# Patient Record
Sex: MALE | Race: WHITE | NOT HISPANIC OR LATINO | Employment: OTHER | URBAN - METROPOLITAN AREA
[De-identification: names, ages, dates, MRNs, and addresses within clinical notes are randomized per-mention and may not be internally consistent; named-entity substitution may affect disease eponyms.]

---

## 2019-08-12 ENCOUNTER — OFFICE VISIT (OUTPATIENT)
Dept: FAMILY MEDICINE CLINIC | Facility: CLINIC | Age: 54
End: 2019-08-12

## 2019-08-12 VITALS
WEIGHT: 206 LBS | DIASTOLIC BLOOD PRESSURE: 92 MMHG | TEMPERATURE: 98.5 F | HEIGHT: 69 IN | SYSTOLIC BLOOD PRESSURE: 148 MMHG | OXYGEN SATURATION: 96 % | RESPIRATION RATE: 20 BRPM | HEART RATE: 82 BPM | BODY MASS INDEX: 30.51 KG/M2

## 2019-08-12 DIAGNOSIS — K21.9 GASTROESOPHAGEAL REFLUX DISEASE, ESOPHAGITIS PRESENCE NOT SPECIFIED: ICD-10-CM

## 2019-08-12 DIAGNOSIS — E29.1 TESTICULAR HYPOGONADISM: ICD-10-CM

## 2019-08-12 DIAGNOSIS — F41.8 DEPRESSION WITH ANXIETY: ICD-10-CM

## 2019-08-12 DIAGNOSIS — G89.4 CHRONIC PAIN SYNDROME: ICD-10-CM

## 2019-08-12 DIAGNOSIS — F51.01 PRIMARY INSOMNIA: ICD-10-CM

## 2019-08-12 DIAGNOSIS — E11.51 TYPE 2 DIABETES MELLITUS WITH DIABETIC PERIPHERAL ANGIOPATHY WITHOUT GANGRENE, WITHOUT LONG-TERM CURRENT USE OF INSULIN (HCC): Primary | ICD-10-CM

## 2019-08-12 DIAGNOSIS — K44.9 HIATAL HERNIA: ICD-10-CM

## 2019-08-12 DIAGNOSIS — R11.2 NON-INTRACTABLE VOMITING WITH NAUSEA, UNSPECIFIED VOMITING TYPE: ICD-10-CM

## 2019-08-12 DIAGNOSIS — E78.2 MIXED HYPERLIPIDEMIA: ICD-10-CM

## 2019-08-12 DIAGNOSIS — I10 ESSENTIAL HYPERTENSION: ICD-10-CM

## 2019-08-12 DIAGNOSIS — R09.81 SINUS CONGESTION: ICD-10-CM

## 2019-08-12 DIAGNOSIS — E55.9 VITAMIN D DEFICIENCY: ICD-10-CM

## 2019-08-12 DIAGNOSIS — J30.9 ALLERGIC RHINITIS, UNSPECIFIED SEASONALITY, UNSPECIFIED TRIGGER: ICD-10-CM

## 2019-08-12 DIAGNOSIS — K30 DELAYED GASTRIC EMPTYING: ICD-10-CM

## 2019-08-12 PROBLEM — G47.33 OSA (OBSTRUCTIVE SLEEP APNEA): Status: ACTIVE | Noted: 2019-08-12

## 2019-08-12 PROBLEM — E78.5 HYPERLIPIDEMIA: Status: ACTIVE | Noted: 2019-08-12

## 2019-08-12 PROBLEM — H93.19 TINNITUS: Status: ACTIVE | Noted: 2019-08-12

## 2019-08-12 PROBLEM — R94.31 PROLONGED QT INTERVAL: Status: ACTIVE | Noted: 2019-08-12

## 2019-08-12 PROBLEM — G47.00 INSOMNIA: Status: ACTIVE | Noted: 2019-08-12

## 2019-08-12 PROBLEM — Q21.12 PFO (PATENT FORAMEN OVALE): Status: ACTIVE | Noted: 2019-08-12

## 2019-08-12 PROBLEM — E11.59 TYPE 2 DIABETES MELLITUS WITH CIRCULATORY DISORDER, WITHOUT LONG-TERM CURRENT USE OF INSULIN: Status: ACTIVE | Noted: 2019-08-12

## 2019-08-12 PROBLEM — R41.3 MEMORY IMPAIRMENT: Status: ACTIVE | Noted: 2019-08-12

## 2019-08-12 PROBLEM — Z86.73 HISTORY OF CVA (CEREBROVASCULAR ACCIDENT): Status: ACTIVE | Noted: 2019-08-12

## 2019-08-12 PROBLEM — J45.909 ASTHMA: Status: ACTIVE | Noted: 2019-08-12

## 2019-08-12 PROBLEM — D64.9 ANEMIA: Status: RESOLVED | Noted: 2019-08-12 | Resolved: 2019-08-12

## 2019-08-12 PROBLEM — R42 VERTIGO: Status: ACTIVE | Noted: 2019-08-12

## 2019-08-12 PROBLEM — H91.93 HEARING LOSS, BILATERAL: Status: ACTIVE | Noted: 2019-08-12

## 2019-08-12 PROBLEM — M26.609 TMJ DYSFUNCTION: Status: ACTIVE | Noted: 2019-08-12

## 2019-08-12 PROBLEM — H04.129 TEAR FILM INSUFFICIENCY: Status: ACTIVE | Noted: 2019-08-12

## 2019-08-12 PROBLEM — M35.00 SICCA SYNDROME: Status: ACTIVE | Noted: 2019-08-12

## 2019-08-12 PROBLEM — M53.9 MULTILEVEL DEGENERATIVE DISC DISEASE: Status: ACTIVE | Noted: 2019-08-12

## 2019-08-12 PROBLEM — D64.9 ANEMIA: Status: ACTIVE | Noted: 2019-08-12

## 2019-08-12 PROCEDURE — 99214 OFFICE O/P EST MOD 30 MIN: CPT | Performed by: FAMILY MEDICINE

## 2019-08-12 RX ORDER — ROSUVASTATIN CALCIUM 10 MG/1
1 TABLET, COATED ORAL NIGHTLY
Refills: 3 | COMMUNITY
Start: 2019-07-08 | End: 2020-04-10 | Stop reason: SDUPTHER

## 2019-08-12 RX ORDER — LISINOPRIL 20 MG/1
20 TABLET ORAL DAILY
Qty: 90 TABLET | Refills: 3 | Status: SHIPPED | OUTPATIENT
Start: 2019-08-12 | End: 2020-02-17

## 2019-08-12 RX ORDER — MONTELUKAST SODIUM 10 MG/1
1 TABLET ORAL NIGHTLY
COMMUNITY
End: 2020-02-17 | Stop reason: SDUPTHER

## 2019-08-12 RX ORDER — AMLODIPINE BESYLATE 10 MG/1
1 TABLET ORAL DAILY
Refills: 3 | COMMUNITY
Start: 2019-08-08 | End: 2019-08-12

## 2019-08-12 RX ORDER — FLUTICASONE PROPIONATE 50 MCG
2 SPRAY, SUSPENSION (ML) NASAL DAILY
Refills: 12 | COMMUNITY
Start: 2019-07-31 | End: 2020-10-20 | Stop reason: SDUPTHER

## 2019-08-12 RX ORDER — LANCETS
1 EACH MISCELLANEOUS EVERY MORNING
COMMUNITY
End: 2023-01-25 | Stop reason: SDUPTHER

## 2019-08-12 RX ORDER — AMLODIPINE BESYLATE 10 MG/1
1 TABLET ORAL DAILY
COMMUNITY
End: 2021-07-21

## 2019-08-12 RX ORDER — GLIPIZIDE 5 MG/1
1 TABLET, FILM COATED, EXTENDED RELEASE ORAL DAILY
Refills: 3 | COMMUNITY
Start: 2019-08-09 | End: 2019-08-12

## 2019-08-12 RX ORDER — TRAZODONE HYDROCHLORIDE 50 MG/1
1 TABLET ORAL NIGHTLY PRN
COMMUNITY
End: 2021-07-21

## 2019-08-12 RX ORDER — ONDANSETRON 4 MG/1
4 TABLET, FILM COATED ORAL EVERY 8 HOURS PRN
Qty: 30 TABLET | Refills: 2 | Status: SHIPPED | OUTPATIENT
Start: 2019-08-12 | End: 2020-08-17 | Stop reason: SDUPTHER

## 2019-08-12 RX ORDER — VORTIOXETINE 20 MG/1
1 TABLET, FILM COATED ORAL DAILY
Refills: 3 | COMMUNITY
Start: 2019-06-23 | End: 2021-07-21

## 2019-08-12 RX ORDER — LANSOPRAZOLE 30 MG/1
30 CAPSULE, DELAYED RELEASE ORAL 2 TIMES DAILY
Qty: 180 CAPSULE | Refills: 3 | Status: SHIPPED | OUTPATIENT
Start: 2019-08-12 | End: 2019-08-15 | Stop reason: CLARIF

## 2019-08-12 RX ORDER — OXYCODONE AND ACETAMINOPHEN 7.5; 325 MG/1; MG/1
1 TABLET ORAL EVERY 6 HOURS PRN
Refills: 0 | COMMUNITY
Start: 2019-08-09 | End: 2020-02-17

## 2019-08-12 RX ORDER — MELOXICAM 15 MG/1
1 TABLET ORAL DAILY
Refills: 0 | COMMUNITY
Start: 2019-08-02 | End: 2021-07-21

## 2019-08-12 RX ORDER — METHADONE HYDROCHLORIDE 10 MG/1
0.5 TABLET ORAL 3 TIMES DAILY
Refills: 0 | COMMUNITY
Start: 2019-08-09 | End: 2020-02-17

## 2019-08-12 RX ORDER — PILOCARPINE HYDROCHLORIDE 7.5 MG/1
1 TABLET, FILM COATED ORAL 3 TIMES DAILY
COMMUNITY
End: 2020-02-17 | Stop reason: SINTOL

## 2019-08-12 RX ORDER — LANSOPRAZOLE 30 MG/1
1 CAPSULE, DELAYED RELEASE ORAL 2 TIMES DAILY
COMMUNITY
End: 2019-08-12 | Stop reason: SDUPTHER

## 2019-08-12 RX ORDER — CHOLECALCIFEROL (VITAMIN D3) 1250 MCG
1 CAPSULE ORAL
COMMUNITY
End: 2020-08-14 | Stop reason: SDUPTHER

## 2019-08-12 RX ORDER — ALBUTEROL SULFATE 90 UG/1
2 AEROSOL, METERED RESPIRATORY (INHALATION) EVERY 4 HOURS PRN
COMMUNITY

## 2019-08-12 RX ORDER — METOCLOPRAMIDE 5 MG/1
1 TABLET ORAL
COMMUNITY
End: 2022-08-26

## 2019-08-12 NOTE — PATIENT INSTRUCTIONS
Gastroesophageal Reflux Disease, Adult    Normally, food travels down the esophagus and stays in the stomach to be digested. However, when a person has gastroesophageal reflux disease (GERD), food and stomach acid move back up into the esophagus. When this happens, the esophagus becomes sore and inflamed. Over time, GERD can create small holes (ulcers) in the lining of the esophagus.  What are the causes?  This condition is caused by a problem with the muscle between the esophagus and the stomach (lower esophageal sphincter, or LES). Normally, the LES muscle closes after food passes through the esophagus to the stomach. When the LES is weakened or abnormal, it does not close properly, and that allows food and stomach acid to go back up into the esophagus. The LES can be weakened by certain dietary substances, medicines, and medical conditions, including:  · Tobacco use.  · Pregnancy.  · Having a hiatal hernia.  · Heavy alcohol use.  · Certain foods and beverages, such as coffee, chocolate, onions, and peppermint.  What increases the risk?  This condition is more likely to develop in:  · People who have an increased body weight.  · People who have connective tissue disorders.  · People who use NSAID medicines.  What are the signs or symptoms?  Symptoms of this condition include:  · Heartburn.  · Difficult or painful swallowing.  · The feeling of having a lump in the throat.  · A bitter taste in the mouth.  · Bad breath.  · Having a large amount of saliva.  · Having an upset or bloated stomach.  · Belching.  · Chest pain.  · Shortness of breath or wheezing.  · Ongoing (chronic) cough or a night-time cough.  · Wearing away of tooth enamel.  · Weight loss.  Different conditions can cause chest pain. Make sure to see your health care provider if you experience chest pain.  How is this diagnosed?  Your health care provider will take a medical history and perform a physical exam. To determine if you have mild or severe  GERD, your health care provider may also monitor how you respond to treatment. You may also have other tests, including:  · An endoscopy to examine your stomach and esophagus with a small camera.  · A test that measures the acidity level in your esophagus.  · A test that measures how much pressure is on your esophagus.  · A barium swallow or modified barium swallow to show the shape, size, and functioning of your esophagus.  How is this treated?  The goal of treatment is to help relieve your symptoms and to prevent complications. Treatment for this condition may vary depending on how severe your symptoms are. Your health care provider may recommend:  · Changes to your diet.  · Medicine.  · Surgery.  Follow these instructions at home:  Diet  · Follow a diet as recommended by your health care provider. This may involve avoiding foods and drinks such as:  ? Coffee and tea (with or without caffeine).  ? Drinks that contain alcohol.  ? Energy drinks and sports drinks.  ? Carbonated drinks or sodas.  ? Chocolate and cocoa.  ? Peppermint and mint flavorings.  ? Garlic and onions.  ? Horseradish.  ? Spicy and acidic foods, including peppers, chili powder, perez powder, vinegar, hot sauces, and barbecue sauce.  ? Citrus fruit juices and citrus fruits, such as oranges, juan carlos, and limes.  ? Tomato-based foods, such as red sauce, chili, salsa, and pizza with red sauce.  ? Fried and fatty foods, such as donuts, french fries, potato chips, and high-fat dressings.  ? High-fat meats, such as hot dogs and fatty cuts of red and white meats, such as rib eye steak, sausage, ham, and lawrence.  ? High-fat dairy items, such as whole milk, butter, and cream cheese.  · Eat small, frequent meals instead of large meals.  · Avoid drinking large amounts of liquid with your meals.  · Avoid eating meals during the 2-3 hours before bedtime.  · Avoid lying down right after you eat.  · Do not exercise right after you eat.  General instructions  · Pay  attention to any changes in your symptoms.  · Take over-the-counter and prescription medicines only as told by your health care provider. Do not take aspirin, ibuprofen, or other NSAIDs unless your health care provider told you to do so.  · Do not use any tobacco products, including cigarettes, chewing tobacco, and e-cigarettes. If you need help quitting, ask your health care provider.  · Wear loose-fitting clothing. Do not wear anything tight around your waist that causes pressure on your abdomen.  · Raise (elevate) the head of your bed 6 inches (15cm).  · Try to reduce your stress, such as with yoga or meditation. If you need help reducing stress, ask your health care provider.  · If you are overweight, reduce your weight to an amount that is healthy for you. Ask your health care provider for guidance about a safe weight loss goal.  · Keep all follow-up visits as told by your health care provider. This is important.  Contact a health care provider if:  · You have new symptoms.  · You have unexplained weight loss.  · You have difficulty swallowing, or it hurts to swallow.  · You have wheezing or a persistent cough.  · Your symptoms do not improve with treatment.  · You have a hoarse voice.  Get help right away if:  · You have pain in your arms, neck, jaw, teeth, or back.  · You feel sweaty, dizzy, or light-headed.  · You have chest pain or shortness of breath.  · You vomit and your vomit looks like blood or coffee grounds.  · You faint.  · Your stool is bloody or black.  · You cannot swallow, drink, or eat.  This information is not intended to replace advice given to you by your health care provider. Make sure you discuss any questions you have with your health care provider.  Document Released: 09/27/2006 Document Revised: 10/30/2018 Document Reviewed: 04/13/2016  [employer] Interactive Patient Education © 2019 [employer]    Food Choices for Gastroesophageal Reflux Disease, Adult  When you have gastroesophageal  reflux disease (GERD), the foods you eat and your eating habits are very important. Choosing the right foods can help ease your discomfort. Think about working with a nutrition specialist (dietitian) to help you make good choices.  What are tips for following this plan?    Meals  · Choose healthy foods that are low in fat, such as fruits, vegetables, whole grains, low-fat dairy products, and lean meat, fish, and poultry.  · Eat small meals often instead of 3 large meals a day. Eat your meals slowly, and in a place where you are relaxed. Avoid bending over or lying down until 2-3 hours after eating.  · Avoid eating meals 2-3 hours before bed.  · Avoid drinking a lot of liquid with meals.  · Cook foods using methods other than frying. Bake, grill, or broil food instead.  · Avoid or limit:  ? Chocolate.  ? Peppermint or spearmint.  ? Alcohol.  ? Pepper.  ? Black and decaffeinated coffee.  ? Black and decaffeinated tea.  ? Bubbly (carbonated) soft drinks.  ? Caffeinated energy drinks and soft drinks.  · Limit high-fat foods such as:  ? Fatty meat or fried foods.  ? Whole milk, cream, butter, or ice cream.  ? Nuts and nut butters.  ? Pastries, donuts, and sweets made with butter or shortening.  · Avoid foods that cause symptoms. These foods may be different for everyone. Common foods that cause symptoms include:  ? Tomatoes.  ? Oranges, juan carlos, and limes.  ? Peppers.  ? Spicy food.  ? Onions and garlic.  ? Vinegar.  Lifestyle  · Maintain a healthy weight. Ask your doctor what weight is healthy for you. If you need to lose weight, work with your doctor to do so safely.  · Exercise for at least 30 minutes for 5 or more days each week, or as told by your doctor.  · Wear loose-fitting clothes.  · Do not smoke. If you need help quitting, ask your doctor.  · Sleep with the head of your bed higher than your feet. Use a wedge under the mattress or blocks under the bed frame to raise the head of the bed.  Summary  · When you have  "gastroesophageal reflux disease (GERD), food and lifestyle choices are very important in easing your symptoms.  · Eat small meals often instead of 3 large meals a day. Eat your meals slowly, and in a place where you are relaxed.  · Limit high-fat foods such as fatty meat or fried foods.  · Avoid bending over or lying down until 2-3 hours after eating.  · Avoid peppermint and spearmint, caffeine, alcohol, and chocolate.  This information is not intended to replace advice given to you by your health care provider. Make sure you discuss any questions you have with your health care provider.  Document Released: 06/18/2013 Document Revised: 01/23/2018 Document Reviewed: 01/23/2018  [employer] Interactive Patient Education © 2019 [employer]    DASH Eating Plan  DASH stands for \"Dietary Approaches to Stop Hypertension.\" The DASH eating plan is a healthy eating plan that has been shown to reduce high blood pressure (hypertension). It may also reduce your risk for type 2 diabetes, heart disease, and stroke. The DASH eating plan may also help with weight loss.  What are tips for following this plan?    General guidelines  · Avoid eating more than 2,300 mg (milligrams) of salt (sodium) a day. If you have hypertension, you may need to reduce your sodium intake to 1,500 mg a day.  · Limit alcohol intake to no more than 1 drink a day for nonpregnant women and 2 drinks a day for men. One drink equals 12 oz of beer, 5 oz of wine, or 1½ oz of hard liquor.  · Work with your health care provider to maintain a healthy body weight or to lose weight. Ask what an ideal weight is for you.  · Get at least 30 minutes of exercise that causes your heart to beat faster (aerobic exercise) most days of the week. Activities may include walking, swimming, or biking.  · Work with your health care provider or diet and nutrition specialist (dietitian) to adjust your eating plan to your individual calorie needs.  Reading food labels    · Check food " "labels for the amount of sodium per serving. Choose foods with less than 5 percent of the Daily Value of sodium. Generally, foods with less than 300 mg of sodium per serving fit into this eating plan.  · To find whole grains, look for the word \"whole\" as the first word in the ingredient list.  Shopping  · Buy products labeled as \"low-sodium\" or \"no salt added.\"  · Buy fresh foods. Avoid canned foods and premade or frozen meals.  Cooking  · Avoid adding salt when cooking. Use salt-free seasonings or herbs instead of table salt or sea salt. Check with your health care provider or pharmacist before using salt substitutes.  · Do not hernandez foods. Cook foods using healthy methods such as baking, boiling, grilling, and broiling instead.  · Cook with heart-healthy oils, such as olive, canola, soybean, or sunflower oil.  Meal planning  · Eat a balanced diet that includes:  ? 5 or more servings of fruits and vegetables each day. At each meal, try to fill half of your plate with fruits and vegetables.  ? Up to 6-8 servings of whole grains each day.  ? Less than 6 oz of lean meat, poultry, or fish each day. A 3-oz serving of meat is about the same size as a deck of cards. One egg equals 1 oz.  ? 2 servings of low-fat dairy each day.  ? A serving of nuts, seeds, or beans 5 times each week.  ? Heart-healthy fats. Healthy fats called Omega-3 fatty acids are found in foods such as flaxseeds and coldwater fish, like sardines, salmon, and mackerel.  · Limit how much you eat of the following:  ? Canned or prepackaged foods.  ? Food that is high in trans fat, such as fried foods.  ? Food that is high in saturated fat, such as fatty meat.  ? Sweets, desserts, sugary drinks, and other foods with added sugar.  ? Full-fat dairy products.  · Do not salt foods before eating.  · Try to eat at least 2 vegetarian meals each week.  · Eat more home-cooked food and less restaurant, buffet, and fast food.  · When eating at a restaurant, ask that your " food be prepared with less salt or no salt, if possible.  What foods are recommended?  The items listed may not be a complete list. Talk with your dietitian about what dietary choices are best for you.  Grains  Whole-grain or whole-wheat bread. Whole-grain or whole-wheat pasta. Brown rice. Oatmeal. Quinoa. Bulgur. Whole-grain and low-sodium cereals. Jennifer bread. Low-fat, low-sodium crackers. Whole-wheat flour tortillas.  Vegetables  Fresh or frozen vegetables (raw, steamed, roasted, or grilled). Low-sodium or reduced-sodium tomato and vegetable juice. Low-sodium or reduced-sodium tomato sauce and tomato paste. Low-sodium or reduced-sodium canned vegetables.  Fruits  All fresh, dried, or frozen fruit. Canned fruit in natural juice (without added sugar).  Meat and other protein foods  Skinless chicken or turkey. Ground chicken or turkey. Pork with fat trimmed off. Fish and seafood. Egg whites. Dried beans, peas, or lentils. Unsalted nuts, nut butters, and seeds. Unsalted canned beans. Lean cuts of beef with fat trimmed off. Low-sodium, lean deli meat.  Dairy  Low-fat (1%) or fat-free (skim) milk. Fat-free, low-fat, or reduced-fat cheeses. Nonfat, low-sodium ricotta or cottage cheese. Low-fat or nonfat yogurt. Low-fat, low-sodium cheese.  Fats and oils  Soft margarine without trans fats. Vegetable oil. Low-fat, reduced-fat, or light mayonnaise and salad dressings (reduced-sodium). Canola, safflower, olive, soybean, and sunflower oils. Avocado.  Seasoning and other foods  Herbs. Spices. Seasoning mixes without salt. Unsalted popcorn and pretzels. Fat-free sweets.  What foods are not recommended?  The items listed may not be a complete list. Talk with your dietitian about what dietary choices are best for you.  Grains  Baked goods made with fat, such as croissants, muffins, or some breads. Dry pasta or rice meal packs.  Vegetables  Creamed or fried vegetables. Vegetables in a cheese sauce. Regular canned vegetables (not  low-sodium or reduced-sodium). Regular canned tomato sauce and paste (not low-sodium or reduced-sodium). Regular tomato and vegetable juice (not low-sodium or reduced-sodium). Pickles. Olives.  Fruits  Canned fruit in a light or heavy syrup. Fried fruit. Fruit in cream or butter sauce.  Meat and other protein foods  Fatty cuts of meat. Ribs. Fried meat. Hernandez. Sausage. Bologna and other processed lunch meats. Salami. Fatback. Hotdogs. Bratwurst. Salted nuts and seeds. Canned beans with added salt. Canned or smoked fish. Whole eggs or egg yolks. Chicken or turkey with skin.  Dairy  Whole or 2% milk, cream, and half-and-half. Whole or full-fat cream cheese. Whole-fat or sweetened yogurt. Full-fat cheese. Nondairy creamers. Whipped toppings. Processed cheese and cheese spreads.  Fats and oils  Butter. Stick margarine. Lard. Shortening. Ghee. Hernandez fat. Tropical oils, such as coconut, palm kernel, or palm oil.  Seasoning and other foods  Salted popcorn and pretzels. Onion salt, garlic salt, seasoned salt, table salt, and sea salt. Worcestershire sauce. Tartar sauce. Barbecue sauce. Teriyaki sauce. Soy sauce, including reduced-sodium. Steak sauce. Canned and packaged gravies. Fish sauce. Oyster sauce. Cocktail sauce. Horseradish that you find on the shelf. Ketchup. Mustard. Meat flavorings and tenderizers. Bouillon cubes. Hot sauce and Tabasco sauce. Premade or packaged marinades. Premade or packaged taco seasonings. Relishes. Regular salad dressings.  Where to find more information:  · National Heart, Lung, and [facility] [place]: www.nhlbi.nih.gov  · American Heart Association: www.heart.org  Summary  · The DASH eating plan is a healthy eating plan that has been shown to reduce high blood pressure (hypertension). It may also reduce your risk for type 2 diabetes, heart disease, and stroke.  · With the DASH eating plan, you should limit salt (sodium) intake to 2,300 mg a day. If you have hypertension, you may need to reduce  your sodium intake to 1,500 mg a day.  · When on the DASH eating plan, aim to eat more fresh fruits and vegetables, whole grains, lean proteins, low-fat dairy, and heart-healthy fats.  · Work with your health care provider or diet and nutrition specialist (dietitian) to adjust your eating plan to your individual calorie needs.  This information is not intended to replace advice given to you by your health care provider. Make sure you discuss any questions you have with your health care provider.  Document Released: 12/06/2012 Document Revised: 12/11/2017 Document Reviewed: 12/11/2017  [employer] Interactive Patient Education © 2019 [employer]

## 2019-08-12 NOTE — PROGRESS NOTES
Subjective   Anthony Jacinto is a 53 y.o. male.   Chief Complaint   Patient presents with   • Diabetes   • Hypertension   • Hyperlipidemia       History of Present Illness   Anthony presents today to re-establish care with provider. Previous patient at [facility]. He brought most of medications with him for verification of doses.    Diabetes: He did not bring his glucometer, nor a blood glucose log. He admits to not checking his blood glucose daily, has been checking it 2-3 times per week, a.m. fasting ,lowest reading 124, highest  142. Had labs last week at [facility]. Those have been obtained and reviewed, A1c is 6.1 on August 6, previous was 7.0 in October.    HTN: Does not check blood pressure, machine is broken.    HLD: Continues on Crestor without any side effects of myalgia or otherwise. Total cholesterol 122, LDL 40, triglycerides 205    Hypogonadism: Testosterone level is low normal at 8.69 mmol/L his neurologist advised him not to restart testosterone replacement due to history of stroke.    Vitamin D deficiency: Patient has not been taking vitamin D for at least the past month level is low normal at 35 he has been drinking more milk and chocolate milk.    Reports a lot of sinus congestion uses nasal steroid daily and rare use of Afrin and previously seen an allergist who did not proceed with allergy testing due to him taking a beta blocker at that time.  Since that time he has seen cardiologist to told him he was in hospital with a stroke despite what the records said he said he should have never been told he had a heart attack at the same time, he is not currently taking a beta blocker.    He is needing refills on Prevacid and metformin    The following portions of the patient's history were reviewed and updated as appropriate: allergies, current medications, past family history, past medical history, past social history, past surgical history and problem list.    Review of Systems   Constitutional: Positive  for unexpected weight loss ( States he lost about 20 pounds in the past 3 weeks since he stopped drinking three 16 ounce Sprites a day). Negative for appetite change, fatigue and fever.   HENT: Positive for congestion, sinus pressure and tinnitus.    Eyes: Negative for visual disturbance.   Respiratory: Negative for cough, shortness of breath and wheezing.    Cardiovascular: Negative for chest pain, palpitations and leg swelling.   Gastrointestinal: Positive for nausea and vomiting. Negative for abdominal pain, constipation, diarrhea and indigestion.   Musculoskeletal: Positive for arthralgias, back pain and neck pain.   Skin: Negative for rash.   Neurological: Positive for headache. Negative for numbness.   Psychiatric/Behavioral: Positive for depressed mood. Negative for sleep disturbance.       Objective    Vitals:    08/12/19 1237   BP: 148/92   Pulse: 82   Resp: 20   Temp: 98.5 °F (36.9 °C)   SpO2: 96%       Physical Exam   Constitutional: He is oriented to person, place, and time. He appears well-developed and well-nourished.   HENT:   Head: Normocephalic and atraumatic.   Nose: Rhinorrhea and congestion present.   Mouth/Throat: No oropharyngeal exudate or posterior oropharyngeal erythema.   Cobblestoning of posterior pharynx   Eyes: Conjunctivae and EOM are normal. Pupils are equal, round, and reactive to light.   Neck: No JVD present. No thyromegaly present.   Cardiovascular: Normal rate, regular rhythm and normal heart sounds.   No murmur heard.  Pulmonary/Chest: Breath sounds normal. He has no wheezes. He has no rales.   Abdominal: Bowel sounds are normal. He exhibits no mass. There is tenderness ( Epigastrium and left upper quadrant). There is no rebound and no guarding.   Musculoskeletal: He exhibits no edema.   Lymphadenopathy:     He has no cervical adenopathy.   Neurological: He is alert and oriented to person, place, and time.   Skin: Skin is warm and dry. No rash noted.   Psychiatric: He has a  normal mood and affect.         Assessment/Plan   Anthony was seen today for diabetes, hypertension and hyperlipidemia.    Diagnoses and all orders for this visit:    Type 2 diabetes mellitus with diabetic peripheral angiopathy without gangrene, without long-term current use of insulin (CMS/[facility])  -     metFORMIN (GLUCOPHAGE) 500 MG tablet; Take 2 tablets by mouth 2 (Two) Times a Day.  -     lisinopril (PRINIVIL,ZESTRIL) 20 MG tablet; Take 1 tablet by mouth Daily.  -     Hemoglobin A1c; Future  -     TSH; Future    Essential hypertension  -     lisinopril (PRINIVIL,ZESTRIL) 20 MG tablet; Take 1 tablet by mouth Daily.  -     Comprehensive Metabolic Panel; Future    Mixed hyperlipidemia    Vitamin D deficiency  -     Vitamin D 25 Hydroxy; Future    Testicular hypogonadism    Gastroesophageal reflux disease, esophagitis presence not specified  -     lansoprazole (PREVACID) 30 MG capsule; Take 1 capsule by mouth 2 (Two) Times a Day.    Hiatal hernia  -     lansoprazole (PREVACID) 30 MG capsule; Take 1 capsule by mouth 2 (Two) Times a Day.    Sinus congestion  -     Ambulatory Referral to Allergy    Non-intractable vomiting with nausea, unspecified vomiting type  -     lansoprazole (PREVACID) 30 MG capsule; Take 1 capsule by mouth 2 (Two) Times a Day.  -     ondansetron (ZOFRAN) 4 MG tablet; Take 1 tablet by mouth Every 8 (Eight) Hours As Needed for Nausea or Vomiting.    Allergic rhinitis, unspecified seasonality, unspecified trigger  -     Ambulatory Referral to Allergy    Delayed gastric emptying    Primary insomnia    Chronic pain syndrome    Depression with anxiety        Due to low A1c and recent reduction in concentrated sugars asked him to discontinue glipizide to reduce chance of hypoglycemia.  Hypertension above goal starting ACE inhibitor with history of diabetes.  Advised to restart vitamin D.  Continue PPIs, handouts on reflux precaution given, difficult to obtain records specifically regarding EGD and   [facility] notes, but also from other specialists through [facility] system in [place]    Return in about 3 months (around 11/12/2019) for Recheck, with Labs.

## 2019-08-12 NOTE — PROGRESS NOTES
Subjective   Anthony Rehrer is a 53 y.o. male.     History of Present Illness     {Common H&P Review Areas:17227}    Review of Systems    Objective   Physical Exam      Assessment/Plan   {Assess/PlanSmartLinks:33699}

## 2019-08-15 RX ORDER — PANTOPRAZOLE SODIUM 40 MG/1
1 TABLET, DELAYED RELEASE ORAL 2 TIMES DAILY
COMMUNITY
End: 2019-08-15 | Stop reason: SDUPTHER

## 2019-08-15 RX ORDER — PANTOPRAZOLE SODIUM 40 MG/1
40 TABLET, DELAYED RELEASE ORAL 2 TIMES DAILY
Qty: 180 TABLET | Refills: 3 | Status: SHIPPED | OUTPATIENT
Start: 2019-08-15 | End: 2019-08-21

## 2019-08-15 NOTE — TELEPHONE ENCOUNTER
Per Dr. Mckeon, on written order change to Pantoprazole 40mg 1 po bid and sent over # [phone] with 3 refills. Med sent. Attempted to notify patient at both #'s. Home # kept ringing. No voicemail box. Called cell phone # listed. No answer. Unable to LVM as to where VM box is full.

## 2019-08-21 RX ORDER — PANTOPRAZOLE SODIUM 40 MG/1
40 TABLET, DELAYED RELEASE ORAL DAILY
Qty: 90 TABLET | Refills: 3 | Status: SHIPPED | OUTPATIENT
Start: 2019-08-21 | End: 2020-07-22 | Stop reason: SDUPTHER

## 2019-11-12 ENCOUNTER — RESULTS ENCOUNTER (OUTPATIENT)
Dept: FAMILY MEDICINE CLINIC | Facility: CLINIC | Age: 54
End: 2019-11-12

## 2019-11-12 DIAGNOSIS — E11.51 TYPE 2 DIABETES MELLITUS WITH DIABETIC PERIPHERAL ANGIOPATHY WITHOUT GANGRENE, WITHOUT LONG-TERM CURRENT USE OF INSULIN (HCC): ICD-10-CM

## 2019-11-12 DIAGNOSIS — I10 ESSENTIAL HYPERTENSION: ICD-10-CM

## 2019-11-12 DIAGNOSIS — E55.9 VITAMIN D DEFICIENCY: ICD-10-CM

## 2020-02-17 ENCOUNTER — OFFICE VISIT (OUTPATIENT)
Dept: FAMILY MEDICINE CLINIC | Facility: CLINIC | Age: 55
End: 2020-02-17

## 2020-02-17 VITALS
DIASTOLIC BLOOD PRESSURE: 89 MMHG | HEIGHT: 69 IN | WEIGHT: 212 LBS | HEART RATE: 72 BPM | TEMPERATURE: 97.9 F | SYSTOLIC BLOOD PRESSURE: 151 MMHG | BODY MASS INDEX: 31.4 KG/M2 | OXYGEN SATURATION: 97 %

## 2020-02-17 DIAGNOSIS — F41.8 DEPRESSION WITH ANXIETY: ICD-10-CM

## 2020-02-17 DIAGNOSIS — E29.1 TESTICULAR HYPOGONADISM: ICD-10-CM

## 2020-02-17 DIAGNOSIS — J45.20 MILD INTERMITTENT ASTHMA WITHOUT COMPLICATION: ICD-10-CM

## 2020-02-17 DIAGNOSIS — E78.2 MIXED HYPERLIPIDEMIA: ICD-10-CM

## 2020-02-17 DIAGNOSIS — J30.9 ALLERGIC RHINITIS, UNSPECIFIED SEASONALITY, UNSPECIFIED TRIGGER: ICD-10-CM

## 2020-02-17 DIAGNOSIS — Z23 FLU VACCINE NEED: ICD-10-CM

## 2020-02-17 DIAGNOSIS — E11.51 TYPE 2 DIABETES MELLITUS WITH DIABETIC PERIPHERAL ANGIOPATHY WITHOUT GANGRENE, WITHOUT LONG-TERM CURRENT USE OF INSULIN (HCC): Primary | ICD-10-CM

## 2020-02-17 DIAGNOSIS — I10 ESSENTIAL HYPERTENSION: ICD-10-CM

## 2020-02-17 DIAGNOSIS — K21.9 GASTROESOPHAGEAL REFLUX DISEASE, ESOPHAGITIS PRESENCE NOT SPECIFIED: ICD-10-CM

## 2020-02-17 DIAGNOSIS — Z23 NEED FOR PNEUMOCOCCAL VACCINE: ICD-10-CM

## 2020-02-17 DIAGNOSIS — M35.00 SICCA SYNDROME (HCC): ICD-10-CM

## 2020-02-17 DIAGNOSIS — E55.9 VITAMIN D DEFICIENCY: ICD-10-CM

## 2020-02-17 PROCEDURE — 90674 CCIIV4 VAC NO PRSV 0.5 ML IM: CPT | Performed by: FAMILY MEDICINE

## 2020-02-17 PROCEDURE — G0008 ADMIN INFLUENZA VIRUS VAC: HCPCS | Performed by: FAMILY MEDICINE

## 2020-02-17 PROCEDURE — 99214 OFFICE O/P EST MOD 30 MIN: CPT | Performed by: FAMILY MEDICINE

## 2020-02-17 RX ORDER — LISINOPRIL 40 MG/1
40 TABLET ORAL DAILY
Qty: 90 TABLET | Refills: 3 | Status: SHIPPED | OUTPATIENT
Start: 2020-02-17 | End: 2020-07-22 | Stop reason: SDUPTHER

## 2020-02-17 RX ORDER — MONTELUKAST SODIUM 10 MG/1
10 TABLET ORAL NIGHTLY
Qty: 90 TABLET | Refills: 3 | Status: SHIPPED | OUTPATIENT
Start: 2020-02-17 | End: 2021-02-02 | Stop reason: SDUPTHER

## 2020-02-17 RX ORDER — CEVIMELINE HYDROCHLORIDE 30 MG/1
30 CAPSULE ORAL 3 TIMES DAILY
Qty: 90 CAPSULE | Refills: 3 | Status: SHIPPED | OUTPATIENT
Start: 2020-02-17 | End: 2020-10-26 | Stop reason: SDUPTHER

## 2020-02-17 NOTE — PROGRESS NOTES
Chief Complaint   Patient presents with   • Diabetes   • Hypertension       Subjective     Anthony Jacinto is a 54 y.o. male.  He is returning today to follow-up on chronic medical conditions including diabetes, hypertension, hyperlipidemia, vitamin D deficiency.  He missed appointment in November making this a six-month follow-up.  Patient has history of sicca syndrome and has been taking pilocarpine but will cause profuse sweating.  Tried stopping it but the dry mouth was very problematic, tried Biotene products over-the-counter and not helpful.  He would like to change to a different medication, his mother uses Evoxac.    Patient has been under pain management for numerous years and had been on methadone and Percocet.  Fairly recently he had marijuana on a drug screen.  He denies using any marijuana, speculates someone may have put it into a food product that he was not aware of, he denies using any CBD oils.  They stopped prescribing the medication, he self weaned the medication and denies any significant withdrawal.  He is continuing to see them but they are not doing any procedures.  He has seen spine surgeon.  He states he does have some increased pain but he is sleeping better and overall feels better and is okay with not trying to restart narcotic pain medication at this time.  He does have about 15 remaining methadone tablets that he has used on rare occasion over the past month when he had a severe pain day.    Hypertension, taking medications per list, but elevated today.  Increasing lisinopril as prescribed below.    Diabetes, very good control, continue metformin and low concentrated sweets diet.  Did warn about sodium in sugar-free flavored beverages.    Patient was admitted in 2010 for a stroke and had been told he had an MI, he states more recently a cardiologist, Dr. Garcia in [place] at [facility] prior to surgery on his hands told him he had no evidence of a previous MI.    Asthma and  allergies, he has been trying to get Singulair refilled unsuccessfully.  He has not had significant increase in his asthma symptoms, but has had increase in allergy symptoms.  He has not been needing to use albuterol.    The following portions of the patient's history were reviewed and updated as appropriate: allergies, current medications, past family history, past medical history, past social history, past surgical history and problem list.    Current Outpatient Medications on File Prior to Visit   Medication Sig   • ACCU-CHEK SOFTCLIX LANCETS lancets 1 each by Other route Every Morning. Use as instructed   • albuterol sulfate  (90 Base) MCG/ACT inhaler Inhale 2 puffs Every 4 (Four) Hours As Needed.   • amLODIPine (NORVASC) 10 MG tablet Take 1 tablet by mouth Daily.   • aspirin 81 MG tablet Take 1 tablet by mouth Daily.   • Cholecalciferol (VITAMIN D3) 44914 units capsule Take 1 capsule by mouth Every 7 (Seven) Days.   • fluticasone (FLONASE) 50 MCG/ACT nasal spray 2 sprays by Each Nare route Daily.   • glucose blood test strip 1 each by Other route Every Morning. DX:E11.9   • LYRICA 75 MG capsule Take 5 capsules by mouth Daily.   • meloxicam (MOBIC) 15 MG tablet Take 1 tablet by mouth Daily. Take with food   • metFORMIN (GLUCOPHAGE) 500 MG tablet Take 2 tablets by mouth 2 (Two) Times a Day.   • metoclopramide (REGLAN) 5 MG tablet Take 1 tablet by mouth 4 (Four) Times a Day Before Meals & at Bedtime.   • ondansetron (ZOFRAN) 4 MG tablet Take 1 tablet by mouth Every 8 (Eight) Hours As Needed for Nausea or Vomiting.   • pantoprazole (PROTONIX) 40 MG EC tablet Take 1 tablet by mouth Daily.   • rosuvastatin (CRESTOR) 10 MG tablet Take 1 tablet by mouth Every Night.   • traZODone (DESYREL) 50 MG tablet Take 1 tablet by mouth At Night As Needed.   • TRINTELLIX 20 MG tablet Take 1 tablet by mouth Daily.   • [DISCONTINUED] lisinopril (PRINIVIL,ZESTRIL) 20 MG tablet Take 1 tablet by mouth Daily.   • [DISCONTINUED]  "pilocarpine (SALAGEN) 7.5 MG tablet Take 1 tablet by mouth 3 (Three) Times a Day.   • [DISCONTINUED] methadone (DOLOPHINE) 10 MG tablet Take 0.5 tablets by mouth 3 (Three) Times a Day,   • [DISCONTINUED] montelukast (SINGULAIR) 10 MG tablet Take 1 tablet by mouth Every Night.   • [DISCONTINUED] oxyCODONE-acetaminophen (PERCOCET) 7.5-325 MG per tablet Take 1 tablet by mouth Every 6 (Six) Hours As Needed.     No current facility-administered medications on file prior to visit.      Medications Discontinued During This Encounter   Medication Reason   • oxyCODONE-acetaminophen (PERCOCET) 7.5-325 MG per tablet *Therapy completed   • methadone (DOLOPHINE) 10 MG tablet *Therapy completed   • pilocarpine (SALAGEN) 7.5 MG tablet Side effects   • montelukast (SINGULAIR) 10 MG tablet Reorder   • lisinopril (PRINIVIL,ZESTRIL) 20 MG tablet        Review of Systems   Constitutional: Negative for appetite change, fatigue and fever.   HENT: Positive for congestion, sinus pressure and tinnitus.    Eyes: Negative for visual disturbance.   Respiratory: Negative for cough, shortness of breath and wheezing.    Cardiovascular: Negative for chest pain, palpitations and leg swelling.   Gastrointestinal: Positive for nausea and vomiting. Negative for abdominal pain, constipation, diarrhea and indigestion.   Musculoskeletal: Positive for arthralgias, back pain and neck pain.   Skin: Negative for rash.   Neurological: Positive for headache. Negative for numbness.   Psychiatric/Behavioral: Positive for depressed mood. Negative for sleep disturbance.        Objective   Vitals:    02/17/20 0832 02/17/20 0838   BP: 152/92 151/89   BP Location: Left arm Left arm   Patient Position: Sitting Sitting   Cuff Size: Adult Adult   Pulse: 72    Temp: 97.9 °F (36.6 °C)    TempSrc: Oral    SpO2: 97%    Weight: 96.2 kg (212 lb)    Height: 175.3 cm (69.02\")       Body mass index is 31.29 kg/m².    Physical Exam   Constitutional: He is oriented to person, " place, and time. He appears well-developed and well-nourished. No distress.   HENT:   Head: Normocephalic and atraumatic.   Left Ear: External ear normal.   Mouth/Throat: Oropharynx is clear and moist. No oropharyngeal exudate or posterior oropharyngeal erythema.   Cobblestoning of posterior pharynx   Eyes: Pupils are equal, round, and reactive to light. Conjunctivae and EOM are normal.   Neck: No JVD present. No thyromegaly present.   Cardiovascular: Normal rate, regular rhythm and normal heart sounds.   No murmur heard.  Pulmonary/Chest: Breath sounds normal. He has no wheezes. He has no rales.   Abdominal: Soft. Bowel sounds are normal.   Musculoskeletal: He exhibits no edema.   Lymphadenopathy:     He has no cervical adenopathy.   Neurological: He is alert and oriented to person, place, and time.   Skin: Skin is warm and dry. No rash noted.   Psychiatric: He has a normal mood and affect.          Labs dated February 5, 2020, A1c 6.3, vitamin D 52.8, TSH 0.87 CMP with a glucose of 113 with otherwise within normal limits.    Procedures     Assessment/Plan   Diagnoses and all orders for this visit:    1. Type 2 diabetes mellitus with diabetic peripheral angiopathy without gangrene, without long-term current use of insulin (CMS/[facility]) (Primary)  -     Hemoglobin A1c; Future  -     lisinopril (PRINIVIL,ZESTRIL) 40 MG tablet; Take 1 tablet by mouth Daily.  Dispense: 90 tablet; Refill: 3    2. Vitamin D deficiency  -     Vitamin D 25 Hydroxy; Future    3. Essential hypertension  -     Comprehensive Metabolic Panel; Future  -     MicroAlbumin, Urine, Random - Urine, Clean Catch; Future  -     lisinopril (PRINIVIL,ZESTRIL) 40 MG tablet; Take 1 tablet by mouth Daily.  Dispense: 90 tablet; Refill: 3    4. Mixed hyperlipidemia  -     Lipid Panel; Future    5. Depression with anxiety    6. Gastroesophageal reflux disease, esophagitis presence not specified    7. Sicca syndrome (CMS/HCC)  -     cevimeline (EVOXAC) 30 MG  capsule; Take 1 capsule by mouth 3 (Three) Times a Day.  Dispense: 90 capsule; Refill: 3    8. Testicular hypogonadism  -     Testosterone; Future    9. Mild intermittent asthma without complication  -     montelukast (SINGULAIR) 10 MG tablet; Take 1 tablet by mouth Every Night.  Dispense: 90 tablet; Refill: 3    10. Allergic rhinitis, unspecified seasonality, unspecified trigger  -     montelukast (SINGULAIR) 10 MG tablet; Take 1 tablet by mouth Every Night.  Dispense: 90 tablet; Refill: 3    Other orders  -     Cancel: Vitamin B12; Future          Medications Discontinued During This Encounter   Medication Reason   • oxyCODONE-acetaminophen (PERCOCET) 7.5-325 MG per tablet *Therapy completed   • methadone (DOLOPHINE) 10 MG tablet *Therapy completed   • pilocarpine (SALAGEN) 7.5 MG tablet Side effects   • montelukast (SINGULAIR) 10 MG tablet Reorder   • lisinopril (PRINIVIL,ZESTRIL) 20 MG tablet           Return in about 3 months (around 5/17/2020) for Recheck, with Labs.  Diabetes at goal continue current therapy.  Hypertension not at goal, increasing lisinopril, Pneumovax and influenza vaccines today.  Changing pilocarpine to Evoxac, did advise that excessive sweating is also a potential side effect with the new medication but he may tolerate it better.      AVS given

## 2020-02-20 PROBLEM — J32.9 CHRONIC SINUSITIS: Status: ACTIVE | Noted: 2019-09-17

## 2020-02-20 PROBLEM — J34.2 DEVIATED NASAL SEPTUM: Status: ACTIVE | Noted: 2019-11-07

## 2020-04-10 DIAGNOSIS — E78.2 MIXED HYPERLIPIDEMIA: ICD-10-CM

## 2020-04-10 RX ORDER — ROSUVASTATIN CALCIUM 10 MG/1
10 TABLET, COATED ORAL NIGHTLY
Qty: 90 TABLET | Refills: 3 | Status: SHIPPED | OUTPATIENT
Start: 2020-04-10 | End: 2021-03-29 | Stop reason: SDUPTHER

## 2020-05-11 ENCOUNTER — RESULTS ENCOUNTER (OUTPATIENT)
Dept: FAMILY MEDICINE CLINIC | Facility: CLINIC | Age: 55
End: 2020-05-11

## 2020-05-11 DIAGNOSIS — I10 ESSENTIAL HYPERTENSION: ICD-10-CM

## 2020-05-11 DIAGNOSIS — E55.9 VITAMIN D DEFICIENCY: ICD-10-CM

## 2020-05-11 DIAGNOSIS — E29.1 TESTICULAR HYPOGONADISM: ICD-10-CM

## 2020-05-11 DIAGNOSIS — E11.51 TYPE 2 DIABETES MELLITUS WITH DIABETIC PERIPHERAL ANGIOPATHY WITHOUT GANGRENE, WITHOUT LONG-TERM CURRENT USE OF INSULIN (HCC): ICD-10-CM

## 2020-05-11 DIAGNOSIS — E78.2 MIXED HYPERLIPIDEMIA: ICD-10-CM

## 2020-07-20 DIAGNOSIS — M35.00 SICCA SYNDROME (HCC): ICD-10-CM

## 2020-07-22 DIAGNOSIS — E11.51 TYPE 2 DIABETES MELLITUS WITH DIABETIC PERIPHERAL ANGIOPATHY WITHOUT GANGRENE, WITHOUT LONG-TERM CURRENT USE OF INSULIN (HCC): ICD-10-CM

## 2020-07-22 DIAGNOSIS — I10 ESSENTIAL HYPERTENSION: ICD-10-CM

## 2020-07-22 RX ORDER — LISINOPRIL 40 MG/1
40 TABLET ORAL DAILY
Qty: 90 TABLET | Refills: 0 | Status: SHIPPED | OUTPATIENT
Start: 2020-07-22 | End: 2020-10-20 | Stop reason: SDUPTHER

## 2020-07-22 RX ORDER — PANTOPRAZOLE SODIUM 40 MG/1
40 TABLET, DELAYED RELEASE ORAL DAILY
Qty: 90 TABLET | Refills: 0 | Status: SHIPPED | OUTPATIENT
Start: 2020-07-22 | End: 2020-10-19 | Stop reason: SDUPTHER

## 2020-07-22 NOTE — TELEPHONE ENCOUNTER
Renewing medications however he is past due for follow-up on his diabetes and other medical problems.  Had ordered labs for May 8 that he has not yet had done and he canceled follow-up.  Please offer to send order for labs to any facility he is willing to have labs drawn (I believe he has used [facility] a lot) have him do labs and schedule an appointment to review.  Can schedule as telephone or video if he does not want to come into the office.

## 2020-07-27 DIAGNOSIS — E11.51 TYPE 2 DIABETES MELLITUS WITH DIABETIC PERIPHERAL ANGIOPATHY WITHOUT GANGRENE, WITHOUT LONG-TERM CURRENT USE OF INSULIN (HCC): ICD-10-CM

## 2020-08-14 DIAGNOSIS — E55.9 VITAMIN D DEFICIENCY: ICD-10-CM

## 2020-08-14 RX ORDER — CHOLECALCIFEROL (VITAMIN D3) 1250 MCG
50000 CAPSULE ORAL
Qty: 14 CAPSULE | Refills: 3 | Status: SHIPPED | OUTPATIENT
Start: 2020-08-14 | End: 2021-06-30 | Stop reason: SDUPTHER

## 2020-08-17 ENCOUNTER — OFFICE VISIT (OUTPATIENT)
Dept: FAMILY MEDICINE CLINIC | Facility: CLINIC | Age: 55
End: 2020-08-17

## 2020-08-17 DIAGNOSIS — E55.9 VITAMIN D DEFICIENCY: ICD-10-CM

## 2020-08-17 DIAGNOSIS — E29.1 TESTICULAR HYPOGONADISM: ICD-10-CM

## 2020-08-17 DIAGNOSIS — R11.2 NON-INTRACTABLE VOMITING WITH NAUSEA, UNSPECIFIED VOMITING TYPE: ICD-10-CM

## 2020-08-17 DIAGNOSIS — E78.2 MIXED HYPERLIPIDEMIA: ICD-10-CM

## 2020-08-17 DIAGNOSIS — I10 ESSENTIAL HYPERTENSION: ICD-10-CM

## 2020-08-17 DIAGNOSIS — K30 DELAYED GASTRIC EMPTYING: ICD-10-CM

## 2020-08-17 DIAGNOSIS — E11.51 TYPE 2 DIABETES MELLITUS WITH DIABETIC PERIPHERAL ANGIOPATHY WITHOUT GANGRENE, WITHOUT LONG-TERM CURRENT USE OF INSULIN (HCC): Primary | ICD-10-CM

## 2020-08-17 DIAGNOSIS — M35.00 SICCA SYNDROME (HCC): ICD-10-CM

## 2020-08-17 DIAGNOSIS — M53.9 MULTILEVEL DEGENERATIVE DISC DISEASE: ICD-10-CM

## 2020-08-17 PROCEDURE — 99443 PR PHYS/QHP TELEPHONE EVALUATION 21-30 MIN: CPT | Performed by: FAMILY MEDICINE

## 2020-08-17 RX ORDER — ONDANSETRON 4 MG/1
4 TABLET, FILM COATED ORAL EVERY 8 HOURS PRN
Qty: 30 TABLET | Refills: 2 | Status: SHIPPED | OUTPATIENT
Start: 2020-08-17 | End: 2021-09-08 | Stop reason: SDUPTHER

## 2020-08-17 NOTE — PROGRESS NOTES
Chief Complaint   Patient presents with   • Diabetes       Subjective     Anthony Jacinto is a 54 y.o. male. You have chosen to receive care through a telephone visit. Do you consent to use a telephone visit for your medical care today? Yes Spent 28 minutes on phone with patient.     Patient is a phone visit today and unable to complete vitals.     Patient is scheduled to follow up on chronic medical conditions including diabetes, hypertension, hyperlipidemia, Sjögren's syndrome, and allergies.    Diabetes, need batteries for glucometer, A1c has risen to 6.9. Has not been following a diet have gained weight todays weight 213. Was 201 after neck surgery, With Dr Christie,  on 6/15/2020, doing great, started PT, went 2 times, but already discharged for home therapy. Have been able to stop all pain medications. Had been on chronic narcotics through pain management for 16 years. Plan to do Radiofrequency nerve ablation through pain management when return.  At follow up BP was good, do not have home cuff.  Did have cardiac work up prior to surgery believe had EKG and doppler US of carotid arteries, may have also had an ECHO all at [facility] with Dr Dr Garcia, cardiologist.   Feeling great since surgery.     Vitamin D level has fallen below 30.  Patient had run out of vitamin D, uncertain time ago and has just restarted vitamin D 50,000 units weekly as prescribed.     States he has no longer taking metoclopramide 5 mg, had requested a refill and had not heard whether it has been approved. Originally prescribed by GI for gastroparesis. Nausea is less often.  Only rarely use Zofran.     He states depression and anxiety are well controlled with current medications he has been struggling again with some [place] phobia but since he is feeling so good with his neck surgery he has been getting out and walking more and thinks he can get past the fear of being around others again.    The following portions of the patient's history were  reviewed and updated as appropriate: allergies, current medications, past family history, past medical history, past social history, past surgical history and problem list.    Current Outpatient Medications on File Prior to Visit   Medication Sig   • ACCU-CHEK SOFTCLIX LANCETS lancets 1 each by Other route Every Morning. Use as instructed   • albuterol sulfate  (90 Base) MCG/ACT inhaler Inhale 2 puffs Every 4 (Four) Hours As Needed.   • amLODIPine (NORVASC) 10 MG tablet Take 1 tablet by mouth Daily.   • cevimeline (EVOXAC) 30 MG capsule Take 1 capsule by mouth 3 (Three) Times a Day.   • Cholecalciferol (VITAMIN D3) 1.25 MG (02308 UT) capsule Take 1 capsule by mouth Every 7 (Seven) Days.   • fluticasone (FLONASE) 50 MCG/ACT nasal spray 2 sprays by Each Nare route Daily.   • glucose blood test strip 1 each by Other route Every Morning. DX:E11.9   • lisinopril (PRINIVIL,ZESTRIL) 40 MG tablet Take 1 tablet by mouth Daily.   • LYRICA 75 MG capsule Take 5 capsules by mouth Daily.   • meloxicam (MOBIC) 15 MG tablet Take 1 tablet by mouth Daily. Take with food   • metFORMIN (GLUCOPHAGE) 500 MG tablet TAKE 2 TABLETS BY MOUTH TWICE DAILY   • montelukast (SINGULAIR) 10 MG tablet Take 1 tablet by mouth Every Night.   • pantoprazole (PROTONIX) 40 MG EC tablet Take 1 tablet by mouth Daily.   • rosuvastatin (CRESTOR) 10 MG tablet Take 1 tablet by mouth Every Night.   • traZODone (DESYREL) 50 MG tablet Take 1 tablet by mouth At Night As Needed.   • TRINTELLIX 20 MG tablet Take 1 tablet by mouth Daily.   • [DISCONTINUED] ondansetron (ZOFRAN) 4 MG tablet Take 1 tablet by mouth Every 8 (Eight) Hours As Needed for Nausea or Vomiting.   • aspirin 81 MG tablet Take 1 tablet by mouth Daily.   • metoclopramide (REGLAN) 5 MG tablet Take 1 tablet by mouth 4 (Four) Times a Day Before Meals & at Bedtime.     No current facility-administered medications on file prior to visit.      Medications Discontinued During This Encounter    Medication Reason   • ondansetron (ZOFRAN) 4 MG tablet Reorder       Review of Systems   Constitutional: Negative for fatigue and fever.   Eyes: Negative for visual disturbance.   Respiratory: Negative for cough, shortness of breath and wheezing.    Cardiovascular: Negative for chest pain, palpitations and leg swelling.   Gastrointestinal: Negative.  Negative for abdominal pain.   Genitourinary: Negative for dysuria, frequency, urgency and urinary incontinence.   Musculoskeletal: Positive for back pain. Negative for neck pain.   Skin: Negative for rash.   Neurological: Positive for numbness (  and shooting pain in feet ).   Psychiatric/Behavioral: Negative for depressed mood.        Objective   There were no vitals filed for this visit.   There is no height or weight on file to calculate BMI.    Physical Exam   Constitutional: He is oriented to person, place, and time.   Pulmonary/Chest:   No conversational dyspnea or cough during phone encounter   Neurological: He is alert and oriented to person, place, and time.   Psychiatric: He has a normal mood and affect. Thought content normal.   Very bright affect, sounds very energetic.           Patient had labs done at [facility] in [place]  on 8/10/2020.  They are attached to the lab orders from 5/11/2020.  I have previously reviewed them and again today with patient.    A1c has risen to 6.9 and vitamin D level has fallen below 30.  Patient had run out of vitamin D and has just restarted vitamin D 50,000 units weekly as prescribed.  Other labs including testosterone, lipid panel, CMP, and microalbuminuria are within normal limits/at treatment goals    Procedures     Assessment/Plan   Diagnoses and all orders for this visit:    1. Type 2 diabetes mellitus with diabetic peripheral angiopathy without gangrene, without long-term current use of insulin (CMS/[facility]) (Primary)  -     Empagliflozin (Jardiance) 10 MG tablet; Take 10 mg by mouth Daily.  Dispense: 90  tablet; Refill: 3  -     Hemoglobin A1c; Future    2. Non-intractable vomiting with nausea, unspecified vomiting type  -     ondansetron (Zofran) 4 MG tablet; Take 1 tablet by mouth Every 8 (Eight) Hours As Needed for Nausea or Vomiting.  Dispense: 30 tablet; Refill: 2    3. Vitamin D deficiency  -     Vitamin D 25 Hydroxy; Future    4. Testicular hypogonadism    5. Sicca syndrome (CMS/HCC)    6. Essential hypertension  -     Comprehensive Metabolic Panel; Future    7. Mixed hyperlipidemia    8. Multilevel degenerative disc disease    9. Delayed gastric emptying    Diabetes, above ideal goal, continue metformin 500 mg 2 twice daily and add Jardiance 10 mg, did warn about risks of UTIs or skin infections and need to clean any urine of the skin.  Additionally do recommend weight reduction and increase physical activity.    Vitamin D deficiency below goal, he is back on his vitamin D supplementation    History of gastroparesis with persistent nausea, refilling Zofran for as needed use, did not notice increase in symptoms after stopped metoclopramide, due to its high side effect profile would not encourage him to restart.    History of testicular hypogonadism with testosterone deficiency, recent testosterone is mid range normal, likely improved following discontinuation of long-term narcotics.    Other conditions at treatment goal, continue current medications.      Medications Discontinued During This Encounter   Medication Reason   • ondansetron (ZOFRAN) 4 MG tablet Reorder          Return in about 3 months (around 11/17/2020) for diabetes, htn, cholesterol.

## 2020-10-19 RX ORDER — PANTOPRAZOLE SODIUM 40 MG/1
40 TABLET, DELAYED RELEASE ORAL DAILY
Qty: 90 TABLET | Refills: 3 | Status: SHIPPED | OUTPATIENT
Start: 2020-10-19 | End: 2020-10-20 | Stop reason: SDUPTHER

## 2020-10-20 DIAGNOSIS — E11.51 TYPE 2 DIABETES MELLITUS WITH DIABETIC PERIPHERAL ANGIOPATHY WITHOUT GANGRENE, WITHOUT LONG-TERM CURRENT USE OF INSULIN (HCC): ICD-10-CM

## 2020-10-20 DIAGNOSIS — R09.81 SINUS CONGESTION: ICD-10-CM

## 2020-10-20 DIAGNOSIS — I10 ESSENTIAL HYPERTENSION: ICD-10-CM

## 2020-10-20 DIAGNOSIS — J30.9 ALLERGIC RHINITIS, UNSPECIFIED SEASONALITY, UNSPECIFIED TRIGGER: ICD-10-CM

## 2020-10-20 RX ORDER — PANTOPRAZOLE SODIUM 40 MG/1
40 TABLET, DELAYED RELEASE ORAL DAILY
Qty: 90 TABLET | Refills: 3 | Status: SHIPPED | OUTPATIENT
Start: 2020-10-20 | End: 2021-11-16

## 2020-10-20 RX ORDER — LISINOPRIL 40 MG/1
40 TABLET ORAL DAILY
Qty: 90 TABLET | Refills: 3 | Status: SHIPPED | OUTPATIENT
Start: 2020-10-20 | End: 2021-12-13

## 2020-10-20 RX ORDER — FLUTICASONE PROPIONATE 50 MCG
2 SPRAY, SUSPENSION (ML) NASAL DAILY
Qty: 3 BOTTLE | Refills: 3 | Status: SHIPPED | OUTPATIENT
Start: 2020-10-20 | End: 2021-08-31

## 2020-10-26 DIAGNOSIS — M35.00 SICCA SYNDROME (HCC): ICD-10-CM

## 2020-10-26 RX ORDER — CEVIMELINE HYDROCHLORIDE 30 MG/1
30 CAPSULE ORAL 3 TIMES DAILY
Qty: 90 CAPSULE | Refills: 3 | Status: SHIPPED | OUTPATIENT
Start: 2020-10-26 | End: 2021-02-25 | Stop reason: SDUPTHER

## 2020-11-16 ENCOUNTER — RESULTS ENCOUNTER (OUTPATIENT)
Dept: FAMILY MEDICINE CLINIC | Facility: CLINIC | Age: 55
End: 2020-11-16

## 2020-11-16 DIAGNOSIS — E55.9 VITAMIN D DEFICIENCY: ICD-10-CM

## 2020-11-16 DIAGNOSIS — E11.51 TYPE 2 DIABETES MELLITUS WITH DIABETIC PERIPHERAL ANGIOPATHY WITHOUT GANGRENE, WITHOUT LONG-TERM CURRENT USE OF INSULIN (HCC): ICD-10-CM

## 2020-11-16 DIAGNOSIS — I10 ESSENTIAL HYPERTENSION: ICD-10-CM

## 2021-02-02 DIAGNOSIS — J45.20 MILD INTERMITTENT ASTHMA WITHOUT COMPLICATION: ICD-10-CM

## 2021-02-02 DIAGNOSIS — J30.9 ALLERGIC RHINITIS, UNSPECIFIED SEASONALITY, UNSPECIFIED TRIGGER: ICD-10-CM

## 2021-02-02 RX ORDER — MONTELUKAST SODIUM 10 MG/1
10 TABLET ORAL NIGHTLY
Qty: 90 TABLET | Refills: 3 | Status: SHIPPED | OUTPATIENT
Start: 2021-02-02 | End: 2021-12-14

## 2021-02-25 ENCOUNTER — TELEPHONE (OUTPATIENT)
Dept: FAMILY MEDICINE CLINIC | Facility: CLINIC | Age: 56
End: 2021-02-25

## 2021-02-25 DIAGNOSIS — M35.00 SICCA SYNDROME (HCC): ICD-10-CM

## 2021-02-25 RX ORDER — CEVIMELINE HYDROCHLORIDE 30 MG/1
30 CAPSULE ORAL 3 TIMES DAILY
Qty: 90 CAPSULE | Refills: 3 | Status: SHIPPED | OUTPATIENT
Start: 2021-02-25 | End: 2021-06-21

## 2021-02-25 NOTE — TELEPHONE ENCOUNTER
Please inform patient that I am renewing his Evoxac however he is past due for follow-up.  Please have him do labs as ordered in November and schedule an appointment to review.  Thank you

## 2021-02-25 NOTE — TELEPHONE ENCOUNTER
HUB TO READ     LEFT MESSAGE    Please inform patient that I am renewing his Evoxac however he is past due for follow-up.  Please have him do labs as ordered in November and schedule an appointment to review.  Thank you

## 2021-02-26 NOTE — TELEPHONE ENCOUNTER
Spoke with patient and he his wife just recent tested negative from being positive with covid and also they are going back and worth with [place]. He will call do labs and make appt as soon as possible

## 2021-03-29 DIAGNOSIS — E78.2 MIXED HYPERLIPIDEMIA: ICD-10-CM

## 2021-03-29 RX ORDER — ROSUVASTATIN CALCIUM 10 MG/1
10 TABLET, COATED ORAL NIGHTLY
Qty: 90 TABLET | Refills: 3 | Status: SHIPPED | OUTPATIENT
Start: 2021-03-29 | End: 2021-12-14

## 2021-06-21 DIAGNOSIS — M35.00 SICCA SYNDROME (HCC): ICD-10-CM

## 2021-06-21 DIAGNOSIS — E11.51 TYPE 2 DIABETES MELLITUS WITH DIABETIC PERIPHERAL ANGIOPATHY WITHOUT GANGRENE, WITHOUT LONG-TERM CURRENT USE OF INSULIN (HCC): ICD-10-CM

## 2021-06-21 RX ORDER — CEVIMELINE HYDROCHLORIDE 30 MG/1
CAPSULE ORAL
Qty: 90 CAPSULE | Refills: 0 | Status: SHIPPED | OUTPATIENT
Start: 2021-06-21 | End: 2021-08-05

## 2021-06-21 NOTE — TELEPHONE ENCOUNTER
Please inform patient I am renewing medications as requested however this will be the last time as he does labs schedules an appointment.  His last appointment was a telephone visit August, prior to last in person visits February 2020.

## 2021-06-30 DIAGNOSIS — E55.9 VITAMIN D DEFICIENCY: ICD-10-CM

## 2021-06-30 RX ORDER — CHOLECALCIFEROL (VITAMIN D3) 1250 MCG
50000 CAPSULE ORAL
Qty: 14 CAPSULE | Refills: 3 | Status: SHIPPED | OUTPATIENT
Start: 2021-06-30 | End: 2021-07-21 | Stop reason: SDUPTHER

## 2021-07-21 ENCOUNTER — OFFICE VISIT (OUTPATIENT)
Dept: FAMILY MEDICINE CLINIC | Facility: CLINIC | Age: 56
End: 2021-07-21

## 2021-07-21 VITALS
DIASTOLIC BLOOD PRESSURE: 83 MMHG | RESPIRATION RATE: 16 BRPM | OXYGEN SATURATION: 97 % | HEIGHT: 69 IN | HEART RATE: 61 BPM | WEIGHT: 183.8 LBS | SYSTOLIC BLOOD PRESSURE: 135 MMHG | BODY MASS INDEX: 27.22 KG/M2 | TEMPERATURE: 97.3 F

## 2021-07-21 DIAGNOSIS — E55.9 VITAMIN D DEFICIENCY: ICD-10-CM

## 2021-07-21 DIAGNOSIS — Z11.59 ENCOUNTER FOR HEPATITIS C SCREENING TEST FOR LOW RISK PATIENT: ICD-10-CM

## 2021-07-21 DIAGNOSIS — I10 ESSENTIAL HYPERTENSION: ICD-10-CM

## 2021-07-21 DIAGNOSIS — E11.51 TYPE 2 DIABETES MELLITUS WITH DIABETIC PERIPHERAL ANGIOPATHY WITHOUT GANGRENE, WITHOUT LONG-TERM CURRENT USE OF INSULIN (HCC): ICD-10-CM

## 2021-07-21 DIAGNOSIS — Z86.73 HISTORY OF CVA (CEREBROVASCULAR ACCIDENT): ICD-10-CM

## 2021-07-21 DIAGNOSIS — J45.20 MILD INTERMITTENT ASTHMA WITHOUT COMPLICATION: Primary | ICD-10-CM

## 2021-07-21 DIAGNOSIS — E78.2 MIXED HYPERLIPIDEMIA: ICD-10-CM

## 2021-07-21 PROCEDURE — 99214 OFFICE O/P EST MOD 30 MIN: CPT | Performed by: FAMILY MEDICINE

## 2021-07-21 RX ORDER — CHOLECALCIFEROL (VITAMIN D3) 1250 MCG
CAPSULE ORAL
Qty: 12 CAPSULE | Refills: 3 | Status: SHIPPED | OUTPATIENT
Start: 2021-07-21 | End: 2022-09-26

## 2021-07-21 RX ORDER — ASPIRIN 81 MG/1
81 TABLET ORAL DAILY
Qty: 30 TABLET | Refills: 12
Start: 2021-07-21

## 2021-07-21 NOTE — PROGRESS NOTES
Chief Complaint  Diabetes and Hypertension    History of Present Illness  Anthony Jacinto presents today for follow-up on diabetes and hypertension.     Patient has not been in the office since February 2020 he did have one telephone visit in August 2020 but has not been seen since that time.    Patient states he does not check his sugars at home. Patient states in the last 6 months he has lost about 20-25 lbs. He has cut most of the soft drinks out. He is drinking more water and milk.     Patient does not check his pressures at home. Patient states he has not been taking the Amlodipine as well as multiple other medications in a long time.     Patient had labs done at [facility] in June. Want to do future labs in [place].    Stopped going to pain center.  Had been off of narcotics for over 1 year and Radioablation did not help his chronic back pain. Do hurt, but dealing with it without medication. Overall feel a lot better. Activity still limited by chronic LBP.     Subjective            Current Outpatient Medications on File Prior to Visit   Medication Sig   • ACCU-CHEK SOFTCLIX LANCETS lancets 1 each by Other route Every Morning. Use as instructed   • cevimeline (EVOXAC) 30 MG capsule TAKE 1 CAPSULE BY MOUTH THREE TIMES DAILY   • Empagliflozin (Jardiance) 10 MG tablet Take 10 mg by mouth Daily.   • fluticasone (FLONASE) 50 MCG/ACT nasal spray 2 sprays by Each Nare route Daily.   • glucose blood test strip 1 each by Other route Every Morning. DX:E11.9   • lisinopril (PRINIVIL,ZESTRIL) 40 MG tablet Take 1 tablet by mouth Daily.   • metFORMIN (GLUCOPHAGE) 500 MG tablet TAKE 2 TABLETS BY MOUTH TWICE DAILY   • metoclopramide (REGLAN) 5 MG tablet Take 1 tablet by mouth 4 (Four) Times a Day Before Meals & at Bedtime.   • montelukast (SINGULAIR) 10 MG tablet Take 1 tablet by mouth Every Night.   • ondansetron (Zofran) 4 MG tablet Take 1 tablet by mouth Every 8 (Eight) Hours As Needed for Nausea or Vomiting.  "  • pantoprazole (PROTONIX) 40 MG EC tablet Take 1 tablet by mouth Daily.   • rosuvastatin (CRESTOR) 10 MG tablet Take 1 tablet by mouth Every Night.   • albuterol sulfate  (90 Base) MCG/ACT inhaler Inhale 2 puffs Every 4 (Four) Hours As Needed.   • [DISCONTINUED] amLODIPine (NORVASC) 10 MG tablet Take 1 tablet by mouth Daily.   • [DISCONTINUED] aspirin 81 MG tablet Take 1 tablet by mouth Daily.   • [DISCONTINUED] Cholecalciferol (Vitamin D3) 1.25 MG (11508 UT) capsule Take 1 capsule by mouth Every 7 (Seven) Days.   • [DISCONTINUED] LYRICA 75 MG capsule Take 5 capsules by mouth Daily.   • [DISCONTINUED] meloxicam (MOBIC) 15 MG tablet Take 1 tablet by mouth Daily. Take with food   • [DISCONTINUED] traZODone (DESYREL) 50 MG tablet Take 1 tablet by mouth At Night As Needed.   • [DISCONTINUED] TRINTELLIX 20 MG tablet Take 1 tablet by mouth Daily.     No current facility-administered medications on file prior to visit.       Objective   Vital Signs:   /83 (BP Location: Left arm, Patient Position: Sitting, Cuff Size: Adult)   Pulse 61   Temp 97.3 °F (36.3 °C) (Infrared)   Resp 16   Ht 175.3 cm (69\")   Wt 83.4 kg (183 lb 12.8 oz)   SpO2 97%   BMI 27.14 kg/m²     Physical Exam  Vitals and nursing note reviewed.   Constitutional:       General: He is not in acute distress.     Appearance: Normal appearance. He is well-developed. He is not ill-appearing or diaphoretic.   HENT:      Head: Normocephalic and atraumatic.   Eyes:      Conjunctiva/sclera: Conjunctivae normal.      Pupils: Pupils are equal, round, and reactive to light.   Neck:      Thyroid: No thyromegaly.      Vascular: No JVD.   Cardiovascular:      Rate and Rhythm: Normal rate and regular rhythm.      Heart sounds: Murmur heard.     Pulmonary:      Breath sounds: Normal breath sounds. No wheezing, rhonchi or rales.   Musculoskeletal:         General: Normal range of motion.      Cervical back: Normal range of motion.   Lymphadenopathy:      " Cervical: No cervical adenopathy.   Skin:     General: Skin is warm and dry.      Findings: No rash.   Neurological:      Mental Status: He is alert and oriented to person, place, and time.   Psychiatric:         Mood and Affect: Mood normal.         Behavior: Behavior normal.      Comments: Patient actually appears much more alert with less memory impairment and brighter affect                Labs from [facility] have been reviewed.               Assessment and Plan    Diagnoses and all orders for this visit:    1. Mild intermittent asthma without complication (Primary)    2. Vitamin D deficiency  -     Cholecalciferol (Vitamin D3) 1.25 MG (82160 UT) capsule; 1 weekly for 8 weeks then reduce to every other week  Dispense: 12 capsule; Refill: 3  -     Vitamin D 25 Hydroxy; Future    3. History of CVA (cerebrovascular accident)  -     aspirin 81 MG EC tablet; Take 1 tablet by mouth Daily.  Dispense: 30 tablet; Refill: 12    4. Mixed hyperlipidemia  -     Lipid Panel; Future  -     MicroAlbumin, Urine, Random - Urine, Clean Catch; Future    5. Essential hypertension  -     CBC & Differential; Future    6. Type 2 diabetes mellitus with diabetic peripheral angiopathy without gangrene, without long-term current use of insulin (CMS/[facility])  -     aspirin 81 MG EC tablet; Take 1 tablet by mouth Daily.  Dispense: 30 tablet; Refill: 12  -     Hemoglobin A1c; Future  -     Comprehensive Metabolic Panel; Future  -     MicroAlbumin, Urine, Random - Urine, Clean Catch; Future    7. Encounter for hepatitis C screening test for low risk patient  -     Hepatitis C Antibody; Future      A1c is at goal at 5.9,   vitamin D was below goal at 27.9 reminded to restart cholecalciferol 50,000 units every week for 8 weeks then other week  Hypertension acceptable control off of amlodipine, continue lisinopril.  History of CVA ask patient to restart aspirin and continue Crestor.  Rechecking labs as ordered prior to appointment in 3  months.    Medications Discontinued During This Encounter   Medication Reason   • traZODone (DESYREL) 50 MG tablet *Therapy completed   • TRINTELLIX 20 MG tablet *Therapy completed   • LYRICA 75 MG capsule *Therapy completed   • meloxicam (MOBIC) 15 MG tablet *Therapy completed   • amLODIPine (NORVASC) 10 MG tablet *Therapy completed   • aspirin 81 MG tablet Reorder   • Cholecalciferol (Vitamin D3) 1.25 MG (70358 UT) capsule Reorder         Follow Up     Return in about 3 months (around 10/21/2021) for Medicare Wellness,Diabetes foot exam,  Recheck, diabetes.    Patient was given instructions and counseling regarding his condition or for health maintenance advice. Please see specific information pulled into the AVS if appropriate.

## 2021-08-05 DIAGNOSIS — M35.00 SICCA SYNDROME (HCC): ICD-10-CM

## 2021-08-05 RX ORDER — CEVIMELINE HYDROCHLORIDE 30 MG/1
CAPSULE ORAL
Qty: 90 CAPSULE | Refills: 3 | Status: SHIPPED | OUTPATIENT
Start: 2021-08-05 | End: 2022-08-26

## 2021-08-31 DIAGNOSIS — R09.81 SINUS CONGESTION: ICD-10-CM

## 2021-08-31 DIAGNOSIS — J30.9 ALLERGIC RHINITIS, UNSPECIFIED SEASONALITY, UNSPECIFIED TRIGGER: ICD-10-CM

## 2021-08-31 RX ORDER — FLUTICASONE PROPIONATE 50 MCG
SPRAY, SUSPENSION (ML) NASAL
Qty: 48 G | Refills: 3 | Status: SHIPPED | OUTPATIENT
Start: 2021-08-31

## 2021-09-07 NOTE — PROGRESS NOTES
"Chief Complaint  Foot Pain      Subjective          Anthony Jacinto presents today for    Foot Pain:  The onset of the foot pain has been abrupt starting 3 weeks ago. The course has been gradually increasing in severity and frequency. The foot pain is severe. The foot pain is characterized as sharp, shooting and \"nerve pain\". Pain is located forefoot  right. The foot pain is aggravated by any weight bearing. Current treatments include medications - nonsteroidal anti-inflammatory drugs and Tylenol with no relief.           Current Outpatient Medications on File Prior to Visit   Medication Sig   • ACCU-CHEK SOFTCLIX LANCETS lancets 1 each by Other route Every Morning. Use as instructed   • albuterol sulfate  (90 Base) MCG/ACT inhaler Inhale 2 puffs Every 4 (Four) Hours As Needed.   • aspirin 81 MG EC tablet Take 1 tablet by mouth Daily.   • cevimeline (EVOXAC) 30 MG capsule TAKE 1 CAPSULE BY MOUTH THREE TIMES DAILY   • Cholecalciferol (Vitamin D3) 1.25 MG (77313 UT) capsule 1 weekly for 8 weeks then reduce to every other week   • Empagliflozin (Jardiance) 10 MG tablet Take 10 mg by mouth Daily.   • fluticasone (FLONASE) 50 MCG/ACT nasal spray SHAKE LIQUID AND USE 2 SPRAYS IN EACH NOSTRIL DAILY   • glucose blood test strip 1 each by Other route Every Morning. DX:E11.9   • lisinopril (PRINIVIL,ZESTRIL) 40 MG tablet Take 1 tablet by mouth Daily.   • metFORMIN (GLUCOPHAGE) 500 MG tablet TAKE 2 TABLETS BY MOUTH TWICE DAILY   • metoclopramide (REGLAN) 5 MG tablet Take 1 tablet by mouth 4 (Four) Times a Day Before Meals & at Bedtime.   • montelukast (SINGULAIR) 10 MG tablet Take 1 tablet by mouth Every Night.   • pantoprazole (PROTONIX) 40 MG EC tablet Take 1 tablet by mouth Daily.   • rosuvastatin (CRESTOR) 10 MG tablet Take 1 tablet by mouth Every Night.     No current facility-administered medications on file prior to visit.       Objective   Vital Signs:   /82 (BP Location: Left arm, Patient Position: Sitting, " "Cuff Size: Adult)   Pulse 71   Temp 97.3 °F (36.3 °C) (Temporal)   Resp 18   Ht 175.3 cm (69\")   Wt 82.5 kg (181 lb 12.8 oz)   SpO2 99% Comment: Room air  BMI 26.85 kg/m²     Physical Exam  Vitals and nursing note reviewed.   Constitutional:       General: He is not in acute distress.     Appearance: He is well-developed.   HENT:      Head: Normocephalic and atraumatic.   Eyes:      Pupils: Pupils are equal, round, and reactive to light.   Cardiovascular:      Rate and Rhythm: Regular rhythm.      Heart sounds: No murmur heard.     Pulmonary:      Breath sounds: Normal breath sounds. No wheezing or rales.   Musculoskeletal:      Comments: Right foot tenderness palpation of the plantar MP region and to a lesser degree in the arch of the foot.  There is no tenderness palpation of the heel there is no pain with dorsiflexion of the foot.  Monofilament testing decreased sensation in the forefoot   Skin:     General: Skin is warm and dry.      Findings: No rash.   Neurological:      Mental Status: He is alert and oriented to person, place, and time.            9/8/2021 11:25 AM     PROCEDURE:  XR FOOT 3+ VW RIGHT-     INDICATIONS:  right foot pain; M79.671-Pain in right foot     COMPARISON:  No Comparisons Available     TECHNIQUE:   A minimum of three routine standard radiographic views were obtained of  the right foot.     FINDINGS:  There is normal bone mineralization and trabeculation. Normal osseous  alignment. No osseous erosion. No acute fracture. The bones of the  midfoot are maintained in alignment.      IMPRESSION:  Negative for acute osseous abnormality.     Electronically Signed By-Juan Brock MD On:9/8/2021 12:43 PM  This report was finalized on [id number] by  Juan Brock MD.           Assessment and Plan    Diagnoses and all orders for this visit:    1. Right foot pain (Primary)  -     XR Foot 3+ View Right  -     Ambulatory Referral to Podiatry    2. Non-intractable vomiting with nausea, " unspecified vomiting type  -     ondansetron (Zofran) 4 MG tablet; Take 1 tablet by mouth Every 8 (Eight) Hours As Needed for Nausea or Vomiting.  Dispense: 30 tablet; Refill: 2    3. Type 2 diabetes mellitus with diabetic neuropathy, without long-term current use of insulin (CMS/[facility])  -     Ambulatory Referral to Podiatry      Right forefront pain with normal x-rays with history of diabetic peripheral neuropathy referring to podiatry for further evaluation and treatment and consideration of diabetic shoes and/or inserts.  Advised patient he should replace current shoe inserts as they are no longer offering support.  Patient declines pain medication.  Advised he can use Tylenol as needed and continue weightbearing as tolerated      Medications Discontinued During This Encounter   Medication Reason   • ondansetron (Zofran) 4 MG tablet Reorder         Follow Up     Return in about 6 weeks (around 10/22/2021) for as previously scheduled.    Patient was given instructions and counseling regarding his condition or for health maintenance advice. Please see specific information pulled into the AVS if appropriate.

## 2021-09-08 ENCOUNTER — OFFICE VISIT (OUTPATIENT)
Dept: FAMILY MEDICINE CLINIC | Facility: CLINIC | Age: 56
End: 2021-09-08

## 2021-09-08 ENCOUNTER — HOSPITAL ENCOUNTER (OUTPATIENT)
Dept: GENERAL RADIOLOGY | Facility: HOSPITAL | Age: 56
Discharge: HOME | End: 2021-09-08
Admitting: FAMILY MEDICINE

## 2021-09-08 VITALS
HEIGHT: 69 IN | BODY MASS INDEX: 26.93 KG/M2 | WEIGHT: 181.8 LBS | RESPIRATION RATE: 18 BRPM | OXYGEN SATURATION: 99 % | SYSTOLIC BLOOD PRESSURE: 138 MMHG | DIASTOLIC BLOOD PRESSURE: 82 MMHG | TEMPERATURE: 97.3 F | HEART RATE: 71 BPM

## 2021-09-08 DIAGNOSIS — E11.40 TYPE 2 DIABETES MELLITUS WITH DIABETIC NEUROPATHY, WITHOUT LONG-TERM CURRENT USE OF INSULIN (HCC): ICD-10-CM

## 2021-09-08 DIAGNOSIS — R11.2 NON-INTRACTABLE VOMITING WITH NAUSEA, UNSPECIFIED VOMITING TYPE: ICD-10-CM

## 2021-09-08 DIAGNOSIS — M79.671 RIGHT FOOT PAIN: Primary | ICD-10-CM

## 2021-09-08 PROBLEM — F11.20 OPIOID DEPENDENCE: Status: ACTIVE | Noted: 2017-11-02

## 2021-09-08 PROBLEM — Z86.59 HISTORY OF DEPRESSION: Status: ACTIVE | Noted: 2017-11-02

## 2021-09-08 PROBLEM — M35.00 SJOGREN'S SYNDROME: Status: ACTIVE | Noted: 2017-11-02

## 2021-09-08 PROBLEM — M86.9 OSTEOMYELITIS (HCC): Status: ACTIVE | Noted: 2017-11-02

## 2021-09-08 PROCEDURE — 99213 OFFICE O/P EST LOW 20 MIN: CPT | Performed by: FAMILY MEDICINE

## 2021-09-08 PROCEDURE — 73630 X-RAY EXAM OF FOOT: CPT

## 2021-09-08 RX ORDER — ONDANSETRON 4 MG/1
4 TABLET, FILM COATED ORAL EVERY 8 HOURS PRN
Qty: 30 TABLET | Refills: 2 | Status: SHIPPED | OUTPATIENT
Start: 2021-09-08

## 2021-09-10 ENCOUNTER — TELEPHONE (OUTPATIENT)
Dept: FAMILY MEDICINE CLINIC | Facility: CLINIC | Age: 56
End: 2021-09-10

## 2021-09-10 NOTE — TELEPHONE ENCOUNTER
----- Message from Linda Stout MD sent at 9/8/2021  5:59 PM EDT -----  Please inform patient x-ray is negative for any bony abnormalities.  Do recommend seeing podiatrist for further evaluation and treatment as per referral placed at office visit.

## 2021-09-14 ENCOUNTER — TELEPHONE (OUTPATIENT)
Dept: FAMILY MEDICINE CLINIC | Facility: CLINIC | Age: 56
End: 2021-09-14

## 2021-09-14 NOTE — TELEPHONE ENCOUNTER
Caller: Anthony Jacinto    Relationship: Self    Best call back number: [phone]    What form or medical record are you requesting: X-RAYS    Who is requesting this form or medical record from you: PATIENT     How would you like to receive the form or medical records (pick-up, mail, fax): FAX  If fax, what is the fax number:   If mail, what is the address:   If pick-up, provide patient with address and location details    Timeframe paperwork needed: ASAP    Additional notes: PATIENT CALLED IN ABOUT HIS REFERRAL TO PODIATRY. HE SAID THAT THE HE CALL DR. ALEXANDRE OFFICE AND THEY NEVER RECEIVED ANYTHING BUT STATES THEY NEED THE X-RAYS. PATIENT SAID HE IS TOO FAR AND NEEDS THE X-RAYS FAXED TO THE OFFICE. PLEASE CALL PATIENT AND ADVISE.

## 2021-09-15 PROBLEM — M86.9 OSTEOMYELITIS (HCC): Status: RESOLVED | Noted: 2017-11-02 | Resolved: 2021-09-15

## 2021-09-15 PROBLEM — F11.20 OPIOID DEPENDENCE (HCC): Status: RESOLVED | Noted: 2017-11-02 | Resolved: 2021-09-15

## 2021-09-15 PROBLEM — E11.40 TYPE 2 DIABETES MELLITUS WITH DIABETIC NEUROPATHY, WITHOUT LONG-TERM CURRENT USE OF INSULIN (HCC): Status: ACTIVE | Noted: 2021-09-15

## 2021-09-18 DIAGNOSIS — E11.51 TYPE 2 DIABETES MELLITUS WITH DIABETIC PERIPHERAL ANGIOPATHY WITHOUT GANGRENE, WITHOUT LONG-TERM CURRENT USE OF INSULIN (HCC): ICD-10-CM

## 2021-09-21 DIAGNOSIS — E11.51 TYPE 2 DIABETES MELLITUS WITH DIABETIC PERIPHERAL ANGIOPATHY WITHOUT GANGRENE, WITHOUT LONG-TERM CURRENT USE OF INSULIN (HCC): ICD-10-CM

## 2021-09-22 RX ORDER — EMPAGLIFLOZIN 10 MG/1
TABLET, FILM COATED ORAL
Qty: 90 TABLET | Refills: 3 | Status: SHIPPED | OUTPATIENT
Start: 2021-09-22 | End: 2021-11-16 | Stop reason: SDUPTHER

## 2021-10-13 DIAGNOSIS — E11.51 TYPE 2 DIABETES MELLITUS WITH DIABETIC PERIPHERAL ANGIOPATHY WITHOUT GANGRENE, WITHOUT LONG-TERM CURRENT USE OF INSULIN (HCC): ICD-10-CM

## 2021-11-15 PROBLEM — Z00.00 MEDICARE ANNUAL WELLNESS VISIT, INITIAL: Status: ACTIVE | Noted: 2021-11-15

## 2021-11-15 NOTE — PROGRESS NOTES
The ABCs of the Annual Wellness Visit  Initial Medicare Wellness Visit    Chief Complaint   Patient presents with   • Medicare Wellness-Initial Visit   And to follow-up on diabetes.  Subjective   History of Present Illness:  Anthony Jacinto is a 56 y.o. male who presents for an Initial Medicare Wellness Visit and diabetes follow up. He is receiving his flu vaccine today and going to the health department for others. His last A1C was 8.9 about a week ago (Results being sent over from [facility] now but had them tell me his A1C over the phone so we had it now)    Did have diabetic foot exam with podiatrist follow up on 11/19/21 right foot pain. Around Oct 21st    Had diabetic eye exam at  [facility] in [facility] in July    Pt admits to eating a lot of chocolate and not watching diet as well as he has previously..     The following portions of the patient's history were reviewed and   updated as appropriate: allergies, current medications, past family history, past medical history, past social history, past surgical history and problem list.     Compared to one year ago, the patient feels his physical   health is the same.    Compared to one year ago, the patient feels his mental   health is the same.    Recent Hospitalizations:  He was not admitted to the hospital during the last year.       Current Medical Providers:  Patient Care Team:  Linda Stout MD as PCP - General (Family Medicine)  Kaylin Monroe as Technologist    Outpatient Medications Prior to Visit   Medication Sig Dispense Refill   • ACCU-CHEK SOFTCLIX LANCETS lancets 1 each by Other route Every Morning. Use as instructed     • albuterol sulfate  (90 Base) MCG/ACT inhaler Inhale 2 puffs Every 4 (Four) Hours As Needed.     • aspirin 81 MG EC tablet Take 1 tablet by mouth Daily. 30 tablet 12   • cevimeline (EVOXAC) 30 MG capsule TAKE 1 CAPSULE BY MOUTH THREE TIMES DAILY 90 capsule 3   • Cholecalciferol (Vitamin D3) 1.25 MG  (02657 UT) capsule 1 weekly for 8 weeks then reduce to every other week 12 capsule 3   • fluticasone (FLONASE) 50 MCG/ACT nasal spray SHAKE LIQUID AND USE 2 SPRAYS IN EACH NOSTRIL DAILY 48 g 3   • glucose blood test strip 1 each by Other route Every Morning. DX:E11.9     • lisinopril (PRINIVIL,ZESTRIL) 40 MG tablet Take 1 tablet by mouth Daily. 90 tablet 3   • metFORMIN (GLUCOPHAGE) 500 MG tablet Take 2 tablets by mouth 2 (Two) Times a Day With Meals. 360 tablet 3   • metoclopramide (REGLAN) 5 MG tablet Take 1 tablet by mouth 4 (Four) Times a Day Before Meals & at Bedtime.     • montelukast (SINGULAIR) 10 MG tablet Take 1 tablet by mouth Every Night. 90 tablet 3   • ondansetron (Zofran) 4 MG tablet Take 1 tablet by mouth Every 8 (Eight) Hours As Needed for Nausea or Vomiting. 30 tablet 2   • rosuvastatin (CRESTOR) 10 MG tablet Take 1 tablet by mouth Every Night. 90 tablet 3   • Jardiance 10 MG tablet tablet TAKE 1 TABLET BY MOUTH DAILY 90 tablet 3   • pantoprazole (PROTONIX) 40 MG EC tablet Take 1 tablet by mouth Daily. 90 tablet 3     No facility-administered medications prior to visit.       No opioid medication identified on active medication list. I have reviewed chart for other potential  high risk medication/s and harmful drug interactions in the elderly.          Aspirin is on active medication list. Aspirin use is contraindicated for this patient due to: history of bleeding.  Patient has been instructed to discontinue this medication. .      Patient Active Problem List   Diagnosis   • Hypertension   • Delayed gastric emptying   • Hyperlipidemia   • Vitamin D deficiency   • Sicca syndrome (HCC)   • Insomnia   • Chronic pain syndrome   • Tinnitus   • TMJ dysfunction   • GERD (gastroesophageal reflux disease)   • Asthma   • Depression with anxiety   • Multilevel degenerative disc disease   • Tear film insufficiency   • Vertigo   • Hearing loss, bilateral   • History of CVA (cerebrovascular accident)   • RUDOLPH  "(obstructive sleep apnea)   • Memory impairment   • PFO (patent foramen ovale)   • Prolonged QT interval   • Hiatal hernia   • Sinus congestion   • Allergic rhinitis   • Deviated nasal septum   • Chronic sinusitis   • Sjogren's syndrome (HCC)   • History of depression   • Type 2 diabetes mellitus with diabetic neuropathy, without long-term current use of insulin (HCC)   • Medicare annual wellness visit, initial     Advance Care Planning  Advance Directive is not on file.  ACP discussion was held with the patient during this visit. Patient does not have an advance directive, declines further assistance.          Objective       Vitals:    21 1120 21 1140   BP: 132/40    Pulse:  78   Resp:  18   Temp:  98.2 °F (36.8 °C)   SpO2:  97%   Weight:  78.1 kg (172 lb 3.2 oz)   Height:  175.3 cm (69\")   PainSc:  0-No pain     BMI Readings from Last 1 Encounters:   21 25.43 kg/m²   BMI is above normal parameters. Recommendations include: nutrition counseling  ATTENTION  What is the year: correct  What is the month of the year: correct  What is the day of the week?: correct  What is the date?: correct  MEMORY  Repeat address three times, only score third attempt: [address], [place]: 3  HOW MANY ANIMALS DID THE PATIENT NAME  Verbal Fluency -- Animal Names (0-25): 22+  CLOCK DRAWING  Clock Drawing: All Correct  MEMORY RECALL  Tell me what you remember about that name and address we were repeating at the beginnin  ACE TOTAL SCORE  Total ACE Score - <25/30 strongly suggests cognitive impairment; <21/30 almost certainly shows dementia: 21      Does the patient have evidence of cognitive impairment? Yes    Physical Exam          HEALTH RISK ASSESSMENT    Smoking Status:  Social History     Tobacco Use   Smoking Status Former Smoker   • Packs/day: 1.50   • Years: 15.00   • Pack years: 22.50   • Types: Cigarettes   • Start date:    • Quit date:    • Years since quittin.8 "   Smokeless Tobacco Never Used     Alcohol Consumption:  Social History     Substance and Sexual Activity   Alcohol Use No     Fall Risk Screen:    MARÍA ELENAADI fall risk screen:  low risk for falls, foot pain makes him feel off balance.    Depression Screen:   PHQ-2/PHQ-9 Depression Screening 11/16/2021   Little interest or pleasure in doing things 0   Feeling down, depressed, or hopeless 0   Trouble falling or staying asleep, or sleeping too much 0   Feeling tired or having little energy 0   Poor appetite or overeating 0   Feeling bad about yourself - or that you are a failure or have let yourself or your family down 0   Trouble concentrating on things, such as reading the newspaper or watching television 0   Moving or speaking so slowly that other people could have noticed. Or the opposite - being so fidgety or restless that you have been moving around a lot more than usual 0   Thoughts that you would be better off dead, or of hurting yourself in some way 0   Total Score 0   If you checked off any problems, how difficult have these problems made it for you to do your work, take care of things at home, or get along with other people? Not difficult at all       Health Habits and Functional and Cognitive Screening:  Functional & Cognitive Status 11/16/2021   Do you have difficulty preparing food and eating? No   Do you have difficulty bathing yourself, getting dressed or grooming yourself? No   Do you have difficulty using the toilet? No   Do you have difficulty moving around from place to place? No   Do you have trouble with steps or getting out of a bed or a chair? No   Current Diet Other        Current Diet Comment Says he eats convienent foods that dont include fruits or vegetables   Dental Exam Not up to date   Eye Exam Up to date   Exercise (times per week) 5 times per week   Current Exercises Include Walking;Yard Work   Do you need help using the phone?  No   Are you deaf or do you have serious difficulty hearing?   Yes   Do you need help with transportation? No   Do you need help shopping? No   Do you need help preparing meals?  No   Do you need help with housework?  No   Do you need help with laundry? No   Do you need help taking your medications? No   Do you need help managing money? No   Do you ever drive or ride in a car without wearing a seat belt? No   Have you felt unusual stress, anger or loneliness in the last month? No   Who do you live with? Spouse   If you need help, do you have trouble finding someone available to you? No   Have you been bothered in the last four weeks by sexual problems? No   Do you have difficulty concentrating, remembering or making decisions? No       Age-appropriate Screening Schedule:  Refer to the list below for future screening recommendations based on patient's age, sex and/or medical conditions. Orders for these recommended tests are listed in the plan section. The patient has been provided with a written plan.    Health Maintenance   Topic Date Due   • TDAP/TD VACCINES (1 - Tdap) Never done   • ZOSTER VACCINE (1 of 2) Never done   • DIABETIC EYE EXAM  08/12/2019   • URINE MICROALBUMIN  05/11/2021   • LIPID PANEL  08/10/2021   • HEMOGLOBIN A1C  12/28/2021   • DIABETIC FOOT EXAM  10/08/2022   • INFLUENZA VACCINE  Completed         last A1C was 8.9, vitamin D was 42, lipid panel at goal, CMP normal 11/11/2021 (Results obtained [facility])  Labs dated 6/28/2021 at outside lab:  A1c at goal at 5.9, vitamin D below goal at 27.9 was off of vitamin D have since restarted cholecalciferol 50,000 units every other week  Lipid panel 8/10/2020 at [facility] in [place] [employer]:  Cholesterol one sixty-four, triglycerides one eighty-seven, HDL fifty-seven, LDL seventy  Assessment/Plan   CMS Preventative Services Quick Reference  Risk Factors Identified During Encounter  Cardiovascular Disease  Fall Risk-High or Moderate  Immunizations Discussed/Encouraged (specific  Immunizations; Td, Tdap, Hepatitis B Vaccine/Series and Shingrix  Inactivity/Sedentary  Obesity/Overweight   The above risks/problems have been discussed with the patient.  Follow up actions/plans if indicated are seen below in the Assessment/Plan Section.  Pertinent information has been shared with the patient in the After Visit Summary.    Diagnoses and all orders for this visit:    1. Medicare annual wellness visit, initial (Primary)    2. Type 2 diabetes mellitus with diabetic neuropathy, without long-term current use of insulin (HCC)    3. Need for influenza vaccination  -     FluLaval/Fluarix/Fluzone >6 Months ([id number])    4. Type 2 diabetes mellitus with diabetic peripheral angiopathy without gangrene, without long-term current use of insulin (HCC)  -     empagliflozin (Jardiance) 25 MG tablet tablet; Take 1 tablet by mouth Daily.  Dispense: 90 tablet; Refill: 3  -     Hemoglobin A1c; Future  -     Comprehensive Metabolic Panel; Future    5. Vitamin D deficiency    6. Primary hypertension      Patient will contact insurance for determination of coverage of needed vaccinations, advised he can get them at a local pharmacy or if covered in office can get at next appointment.    Type 2 diabetes with increased A1c.  Discussed medication diet and increase physical activity.  He will continue Metformin and increase Jardiance.  We will obtain records for diabetic eye exams and podiatry appointment/diabetic foot exam.    Vitamin D deficiency, cholesterol, hypertension at goal continue current medications.  Follow Up:  Return in about 3 months (around 2/16/2022) for Recheck, diabetes, with Labs.     An After Visit Summary and PPPS were made available to the patient.

## 2021-11-16 ENCOUNTER — OFFICE VISIT (OUTPATIENT)
Dept: FAMILY MEDICINE CLINIC | Facility: CLINIC | Age: 56
End: 2021-11-16

## 2021-11-16 VITALS
SYSTOLIC BLOOD PRESSURE: 132 MMHG | OXYGEN SATURATION: 97 % | HEIGHT: 69 IN | TEMPERATURE: 98.2 F | BODY MASS INDEX: 25.51 KG/M2 | WEIGHT: 172.2 LBS | DIASTOLIC BLOOD PRESSURE: 40 MMHG | RESPIRATION RATE: 18 BRPM | HEART RATE: 78 BPM

## 2021-11-16 DIAGNOSIS — E78.2 MIXED HYPERLIPIDEMIA: ICD-10-CM

## 2021-11-16 DIAGNOSIS — E11.40 TYPE 2 DIABETES MELLITUS WITH DIABETIC NEUROPATHY, WITHOUT LONG-TERM CURRENT USE OF INSULIN (HCC): ICD-10-CM

## 2021-11-16 DIAGNOSIS — I10 PRIMARY HYPERTENSION: ICD-10-CM

## 2021-11-16 DIAGNOSIS — K21.9 GASTROESOPHAGEAL REFLUX DISEASE, UNSPECIFIED WHETHER ESOPHAGITIS PRESENT: Primary | ICD-10-CM

## 2021-11-16 DIAGNOSIS — Z00.00 MEDICARE ANNUAL WELLNESS VISIT, INITIAL: Primary | ICD-10-CM

## 2021-11-16 DIAGNOSIS — E55.9 VITAMIN D DEFICIENCY: ICD-10-CM

## 2021-11-16 DIAGNOSIS — E11.51 TYPE 2 DIABETES MELLITUS WITH DIABETIC PERIPHERAL ANGIOPATHY WITHOUT GANGRENE, WITHOUT LONG-TERM CURRENT USE OF INSULIN (HCC): ICD-10-CM

## 2021-11-16 DIAGNOSIS — Z23 NEED FOR INFLUENZA VACCINATION: ICD-10-CM

## 2021-11-16 PROCEDURE — 1126F AMNT PAIN NOTED NONE PRSNT: CPT | Performed by: FAMILY MEDICINE

## 2021-11-16 PROCEDURE — G0008 ADMIN INFLUENZA VIRUS VAC: HCPCS | Performed by: FAMILY MEDICINE

## 2021-11-16 PROCEDURE — G0402 INITIAL PREVENTIVE EXAM: HCPCS | Performed by: FAMILY MEDICINE

## 2021-11-16 PROCEDURE — 90686 IIV4 VACC NO PRSV 0.5 ML IM: CPT | Performed by: FAMILY MEDICINE

## 2021-11-16 PROCEDURE — 1160F RVW MEDS BY RX/DR IN RCRD: CPT | Performed by: FAMILY MEDICINE

## 2021-11-16 PROCEDURE — 1170F FXNL STATUS ASSESSED: CPT | Performed by: FAMILY MEDICINE

## 2021-11-16 PROCEDURE — 99214 OFFICE O/P EST MOD 30 MIN: CPT | Performed by: FAMILY MEDICINE

## 2021-11-16 RX ORDER — PANTOPRAZOLE SODIUM 40 MG/1
40 TABLET, DELAYED RELEASE ORAL DAILY
Qty: 90 TABLET | Refills: 3 | Status: SHIPPED | OUTPATIENT
Start: 2021-11-16

## 2021-12-11 DIAGNOSIS — E11.51 TYPE 2 DIABETES MELLITUS WITH DIABETIC PERIPHERAL ANGIOPATHY WITHOUT GANGRENE, WITHOUT LONG-TERM CURRENT USE OF INSULIN (HCC): ICD-10-CM

## 2021-12-11 DIAGNOSIS — I10 ESSENTIAL HYPERTENSION: ICD-10-CM

## 2021-12-13 RX ORDER — LISINOPRIL 40 MG/1
40 TABLET ORAL DAILY
Qty: 90 TABLET | Refills: 3 | Status: SHIPPED | OUTPATIENT
Start: 2021-12-13 | End: 2022-08-26 | Stop reason: SDUPTHER

## 2021-12-14 DIAGNOSIS — J30.9 ALLERGIC RHINITIS, UNSPECIFIED SEASONALITY, UNSPECIFIED TRIGGER: ICD-10-CM

## 2021-12-14 DIAGNOSIS — E78.2 MIXED HYPERLIPIDEMIA: ICD-10-CM

## 2021-12-14 DIAGNOSIS — J45.20 MILD INTERMITTENT ASTHMA WITHOUT COMPLICATION: ICD-10-CM

## 2021-12-14 RX ORDER — MONTELUKAST SODIUM 10 MG/1
10 TABLET ORAL NIGHTLY
Qty: 90 TABLET | Refills: 3 | Status: SHIPPED | OUTPATIENT
Start: 2021-12-14

## 2021-12-14 RX ORDER — ROSUVASTATIN CALCIUM 10 MG/1
10 TABLET, COATED ORAL NIGHTLY
Qty: 90 TABLET | Refills: 3 | Status: SHIPPED | OUTPATIENT
Start: 2021-12-14 | End: 2023-01-18

## 2021-12-21 ENCOUNTER — TELEPHONE (OUTPATIENT)
Dept: FAMILY MEDICINE CLINIC | Facility: CLINIC | Age: 56
End: 2021-12-21

## 2022-02-04 ENCOUNTER — TELEPHONE (OUTPATIENT)
Dept: FAMILY MEDICINE CLINIC | Facility: CLINIC | Age: 57
End: 2022-02-04

## 2022-02-04 NOTE — TELEPHONE ENCOUNTER
PATIENT CALLED ABOUT THIS. SAYS THEY ARE STILL WAITING ON SOMETHING FROM DR. MULTANI BEFORE THEY WILL GIVE HIM THE DIABETIC SHOES. PLEASE REACH OUT TO PATIENT       THEY NEED SIGNED MEDICAL NOTES AND THE SIGNED FOOT EXAM, AND SIGNED CERTIFIED STATEMENT THAT WAS SENT OVER.

## 2022-02-04 NOTE — TELEPHONE ENCOUNTER
Caller: Anthony Jacinto    Relationship: Self    Best call back number: [phone]    What orders are you requesting (i.e. lab or imaging): LABS    In what timeframe would the patient need to come in: ASAP    Where will you receive your lab/imaging services: [facility]    Additional notes:

## 2022-02-07 NOTE — TELEPHONE ENCOUNTER
Spoke with patient he states that he has yet to receive his shoes. And would like to know what was going on. Informed the patient that I will look into it and call to see what is going on.

## 2022-02-07 NOTE — TELEPHONE ENCOUNTER
Called [facility] ([phone]) I spoke with Caitlin as she states that they are needing the patients foot exam and last page of his 11/16 note  And a signed statement. She is sending me some papers to fill out. Will send back when completed.

## 2022-02-08 ENCOUNTER — TELEPHONE (OUTPATIENT)
Dept: FAMILY MEDICINE CLINIC | Facility: CLINIC | Age: 57
End: 2022-02-08

## 2022-02-08 NOTE — TELEPHONE ENCOUNTER
Patient is aware that labs were sent this morning. As well as the satuts of his shoes I told him I just got the fax from [facility] and will get it signed and sent back today.     Fax sent

## 2022-02-08 NOTE — TELEPHONE ENCOUNTER
Caller: Anthony Jacinto    Relationship: Self    Best call back number: [phone]       What orders are you requesting (i.e. lab or imaging): LABS    In what timeframe would the patient need to come in: ASAP     Where will you receive your lab/imaging services: [facility]

## 2022-02-08 NOTE — TELEPHONE ENCOUNTER
Needs lab orders sent to [facility] asap. Also, inquiring about his DM Shoes. He really needs them, please check status on  both and please give Anthony a call.

## 2022-02-09 ENCOUNTER — TELEPHONE (OUTPATIENT)
Dept: FAMILY MEDICINE CLINIC | Facility: CLINIC | Age: 57
End: 2022-02-09

## 2022-02-09 NOTE — TELEPHONE ENCOUNTER
Patient called stating [facility] is having issues with their fax machine. They ask that we fax the signed orders to [phone].

## 2022-08-11 ENCOUNTER — TELEPHONE (OUTPATIENT)
Dept: FAMILY MEDICINE CLINIC | Facility: CLINIC | Age: 57
End: 2022-08-11

## 2022-08-11 NOTE — TELEPHONE ENCOUNTER
Caller: Anthony Jacinto    Relationship: Self    Best call back number: [phone]    What was the call regarding: PLEASE ADVISE IF PATIENT'S LABS HAVE COME IN FROM [facility]. PATIENT WOULD LIKE TO DISCUSS HIS BLOOD SUGARS, HE THINKS HIS MACHINE OR TEST STRIPS ARE MESSED UP.     Do you require a callback: YES

## 2022-08-12 NOTE — TELEPHONE ENCOUNTER
I have reviewed labs dated August 8, 2022, unfortunately what is scanned is only pages 3 and 4 of 4.  Suspect page 2 would have had an A1c reading.  Please try to get this record.  Please contact patient and ask exactly what his concern is are his sugars running high or low?  Is he having symptoms with his diabetes.  Patient is 6 months past due for appointment for diabetes.  Please have him schedule an appointment.

## 2022-08-15 DIAGNOSIS — E11.51 TYPE 2 DIABETES MELLITUS WITH DIABETIC PERIPHERAL ANGIOPATHY WITHOUT GANGRENE, WITHOUT LONG-TERM CURRENT USE OF INSULIN: ICD-10-CM

## 2022-08-15 NOTE — TELEPHONE ENCOUNTER
I have requested the other labs from [place]. I also spoke to patient who states his test strips were  by 4 plus years and thinks that is why his blood sugars are all over the place. I sent in a refill for new ones. He will check his blood sugars report any low or high blood sugars to office before appointment. Otherwise we will review at his sidra on the .  He will bring monitor with him to his appointment.

## 2022-08-16 ENCOUNTER — TELEPHONE (OUTPATIENT)
Dept: FAMILY MEDICINE CLINIC | Facility: CLINIC | Age: 57
End: 2022-08-16

## 2022-08-16 NOTE — TELEPHONE ENCOUNTER
Patient called stating that the wrong glucose test strips was sent in .  He needs the Accucheck Fanta test strip sent in . Please contact him at . Thanks Lakeshia

## 2022-08-16 NOTE — TELEPHONE ENCOUNTER
Called pharmacy and told them we send generic script and they can fill patients preferance. She said yes we do and she would call patient and clarify. I notified patient as well.

## 2022-08-26 ENCOUNTER — OFFICE VISIT (OUTPATIENT)
Dept: FAMILY MEDICINE CLINIC | Facility: CLINIC | Age: 57
End: 2022-08-26

## 2022-08-26 VITALS
HEIGHT: 69 IN | OXYGEN SATURATION: 100 % | WEIGHT: 169.5 LBS | SYSTOLIC BLOOD PRESSURE: 113 MMHG | BODY MASS INDEX: 25.1 KG/M2 | HEART RATE: 66 BPM | TEMPERATURE: 98 F | RESPIRATION RATE: 18 BRPM | DIASTOLIC BLOOD PRESSURE: 75 MMHG

## 2022-08-26 DIAGNOSIS — E78.2 MIXED HYPERLIPIDEMIA: ICD-10-CM

## 2022-08-26 DIAGNOSIS — E66.3 OVERWEIGHT (BMI 25.0-29.9): ICD-10-CM

## 2022-08-26 DIAGNOSIS — I10 PRIMARY HYPERTENSION: ICD-10-CM

## 2022-08-26 DIAGNOSIS — E11.51 TYPE 2 DIABETES MELLITUS WITH DIABETIC PERIPHERAL ANGIOPATHY WITHOUT GANGRENE, WITHOUT LONG-TERM CURRENT USE OF INSULIN: Primary | ICD-10-CM

## 2022-08-26 DIAGNOSIS — E11.42 DIABETIC PERIPHERAL NEUROPATHY: ICD-10-CM

## 2022-08-26 LAB
BILIRUB BLD-MCNC: NEGATIVE MG/DL
CLARITY, POC: CLEAR
COLOR UR: YELLOW
GLUCOSE UR STRIP-MCNC: ABNORMAL MG/DL
KETONES UR QL: NEGATIVE
LEUKOCYTE EST, POC: NEGATIVE
NITRITE UR-MCNC: NEGATIVE MG/ML
PH UR: 5 [PH] (ref 5–8)
POC MICROALBUMIN URINE: NORMAL
PROT UR STRIP-MCNC: ABNORMAL MG/DL
RBC # UR STRIP: NEGATIVE /UL
SP GR UR: 1.01 (ref 1–1.03)
UROBILINOGEN UR QL: NORMAL

## 2022-08-26 PROCEDURE — 81002 URINALYSIS NONAUTO W/O SCOPE: CPT | Performed by: FAMILY MEDICINE

## 2022-08-26 PROCEDURE — 82044 UR ALBUMIN SEMIQUANTITATIVE: CPT | Performed by: FAMILY MEDICINE

## 2022-08-26 PROCEDURE — 99214 OFFICE O/P EST MOD 30 MIN: CPT | Performed by: FAMILY MEDICINE

## 2022-08-26 PROCEDURE — 3062F POS MACROALBUMINURIA REV: CPT | Performed by: FAMILY MEDICINE

## 2022-08-26 RX ORDER — LISINOPRIL 20 MG/1
20 TABLET ORAL DAILY
Qty: 90 TABLET | Refills: 3 | Status: SHIPPED | OUTPATIENT
Start: 2022-08-26

## 2022-08-26 RX ORDER — PREGABALIN 50 MG/1
CAPSULE ORAL
Qty: 270 CAPSULE | Refills: 0 | Status: SHIPPED | OUTPATIENT
Start: 2022-08-26 | End: 2023-01-25 | Stop reason: SDUPTHER

## 2022-08-26 NOTE — PROGRESS NOTES
Chief Complaint  Diabetes, Hypertension, and Hyperlipidemia    Anthony Jacinto presents today for follow up on diabetes, hypertension and hyperlipidemia.   Metformin and Jardiance is current treatment for diabetes. Patient states blood sugar has been fluctuating. Checks blood sugar daily. Associated symptoms include hunger, blurred vision, and neuropathy in feet. Patient is compliant with treatment most of the time. Eating generally unhealthy diet. Patient does avoid concentrated sweets. Up to date on Diabetic eye exam. Does wear diabetic shoes, was seen by  in [facility].   Patient states that hypertension has been well controlled.   Hyperlipidemia level is well controlled.   Total Cholesterol 129  HDL 48  LDL 68  Triglycerides 52    Labs 2022 at [facility] A1c is 5.9    CMP okay, GFR 84, glucose 130, LFTs within normal limits.  Sodium just below normal at 136 potassium 4.6  Home sugars were low as low as 52, and felt weird  Felt better when stopped metformen and jardiance for 2 weeks, but sugars went up however   Home glucose strips were  so uncertain if those higher readings were accurate    When restarted diabetes medicines felt bad again tired wiped out  Now a.m. fasting glucose 107 - 136 with isolated high of 160 in past 2 weeks.  Starting to feel better again    Current Outpatient Medications on File Prior to Visit   Medication Sig   • ACCU-CHEK SOFTCLIX LANCETS lancets 1 each by Other route Every Morning. Use as instructed   • albuterol sulfate  (90 Base) MCG/ACT inhaler Inhale 2 puffs Every 4 (Four) Hours As Needed.   • aspirin 81 MG EC tablet Take 1 tablet by mouth Daily.   • Cholecalciferol (Vitamin D3) 1.25 MG (23272 UT) capsule 1 weekly for 8 weeks then reduce to every other week   • empagliflozin (Jardiance) 25 MG tablet tablet Take 1 tablet by mouth Daily.   • fluticasone (FLONASE) 50 MCG/ACT nasal spray SHAKE LIQUID AND USE 2 SPRAYS IN EACH NOSTRIL DAILY  "  • glucose blood test strip 1 each by Other route Every Morning. DX:E11.9   • montelukast (SINGULAIR) 10 MG tablet TAKE 1 TABLET BY MOUTH EVERY NIGHT   • ondansetron (Zofran) 4 MG tablet Take 1 tablet by mouth Every 8 (Eight) Hours As Needed for Nausea or Vomiting.   • pantoprazole (PROTONIX) 40 MG EC tablet Take 1 tablet by mouth Daily.   • rosuvastatin (CRESTOR) 10 MG tablet TAKE 1 TABLET BY MOUTH EVERY NIGHT   • [DISCONTINUED] cevimeline (EVOXAC) 30 MG capsule TAKE 1 CAPSULE BY MOUTH THREE TIMES DAILY   • [DISCONTINUED] lisinopril (PRINIVIL,ZESTRIL) 40 MG tablet TAKE 1 TABLET BY MOUTH DAILY   • [DISCONTINUED] metFORMIN (GLUCOPHAGE) 500 MG tablet Take 2 tablets by mouth 2 (Two) Times a Day With Meals.   • [DISCONTINUED] metoclopramide (REGLAN) 5 MG tablet Take 1 tablet by mouth 4 (Four) Times a Day Before Meals & at Bedtime.     No current facility-administered medications on file prior to visit.       Objective   Vital Signs:   /75 (BP Location: Right arm, Patient Position: Sitting, Cuff Size: Adult)   Pulse 66   Temp 98 °F (36.7 °C) (Temporal)   Resp 18   Ht 175.3 cm (69\")   Wt 76.9 kg (169 lb 8 oz)   SpO2 100% Comment: room air  BMI 25.03 kg/m²       Physical Exam  Vitals and nursing note reviewed.   Constitutional:       General: He is not in acute distress.     Appearance: Normal appearance. He is well-developed. He is not ill-appearing.   Eyes:      Conjunctiva/sclera: Conjunctivae normal.      Pupils: Pupils are equal, round, and reactive to light.   Cardiovascular:      Rate and Rhythm: Normal rate and regular rhythm.      Heart sounds: No murmur heard.  Pulmonary:      Effort: Pulmonary effort is normal.      Breath sounds: No wheezing.   Musculoskeletal:         General: Normal range of motion.      Cervical back: Normal range of motion.   Skin:     General: Skin is warm and dry.   Neurological:      Mental Status: He is alert and oriented to person, place, and time.   Psychiatric:         " Mood and Affect: Mood normal.         Thought Content: Thought content normal.         Judgment: Judgment normal.                      No results found for: HGBA1C             Assessment and Plan    Diagnoses and all orders for this visit:    1. Type 2 diabetes mellitus with diabetic peripheral angiopathy without gangrene, without long-term current use of insulin (HCC) (Primary)  -     POCT microalbumin  -     POCT urinalysis dipstick, manual  -     metFORMIN (GLUCOPHAGE) 500 MG tablet; Take 1 tablet by mouth Daily With Breakfast.  Dispense: 90 tablet; Refill: 3  -     lisinopril (PRINIVIL,ZESTRIL) 20 MG tablet; Take 1 tablet by mouth Daily.  Dispense: 90 tablet; Refill: 3  -     Hemoglobin A1c; Future  -     Comprehensive Metabolic Panel; Future  -     pregabalin (Lyrica) 50 MG capsule; 1 bid for 1 week if needed increase to tid  Dispense: 270 capsule; Refill: 0    2. Overweight (BMI 25.0-29.9)  Assessment & Plan:  Patient's (Body mass index is 25.03 kg/m².) indicates that they are overweight with health conditions that include hypertension, diabetes mellitus and dyslipidemias . Weight is improving with lifestyle modifications. BMI is is above average; BMI management plan is completed. We discussed portion control and increasing exercise.       3. Primary hypertension    4. Mixed hyperlipidemia    5. Diabetic peripheral neuropathy (HCC)  -     pregabalin (Lyrica) 50 MG capsule; 1 bid for 1 week if needed increase to tid  Dispense: 270 capsule; Refill: 0      Per our records Anthony has lost 12 pounds in the past year.  He has had improvement in diabetes and blood pressure to the point that he may be having low blood pressures and hypoglycemia.  Reducing metformin to 500 mg daily continuing Jardiance 25 mg for now but would decrease to 10 mg if he encounters further low blood sugars.  Advise goal is to keep a.m. fasting sugars between 80 and 160.  Hypertension low today with recent symptoms of feeling weak and tired  reducing lisinopril from 40 to 20 mg daily.  Did discuss proper nutrition, encouraged him to try different vegetables and whole grain breads.  Diabetic neuropathy with increase in foot pain.  Patient has been on gabapentin and Lyrica in the past more so for chronic back pain.  He states high doses of gabapentin were not as effective as Lyrica.  We will start low-dose Lyrica advised if he has trouble with weight gain may need to reconsider gabapentin.  Medications Discontinued During This Encounter   Medication Reason   • cevimeline (EVOXAC) 30 MG capsule *Therapy completed   • metoclopramide (REGLAN) 5 MG tablet *Therapy completed   • metFORMIN (GLUCOPHAGE) 500 MG tablet    • lisinopril (PRINIVIL,ZESTRIL) 40 MG tablet Reorder         Follow Up     Return in about 3 months (around 11/26/2022) for Recheck, diabetes, htn.    Patient was given instructions and counseling regarding his condition or for health maintenance advice. Please see specific information pulled into the AVS if appropriate.

## 2022-08-26 NOTE — ASSESSMENT & PLAN NOTE
Patient's (Body mass index is 25.03 kg/m².) indicates that they are overweight with health conditions that include hypertension, diabetes mellitus and dyslipidemias . Weight is improving with lifestyle modifications. BMI is is above average; BMI management plan is completed. We discussed portion control and increasing exercise.

## 2022-09-26 DIAGNOSIS — E55.9 VITAMIN D DEFICIENCY: ICD-10-CM

## 2022-09-26 RX ORDER — CHOLECALCIFEROL (VITAMIN D3) 1250 MCG
CAPSULE ORAL
Qty: 6 CAPSULE | Refills: 3 | Status: SHIPPED | OUTPATIENT
Start: 2022-09-26

## 2023-01-06 DIAGNOSIS — E11.51 TYPE 2 DIABETES MELLITUS WITH DIABETIC PERIPHERAL ANGIOPATHY WITHOUT GANGRENE, WITHOUT LONG-TERM CURRENT USE OF INSULIN: ICD-10-CM

## 2023-01-06 RX ORDER — EMPAGLIFLOZIN 25 MG/1
TABLET, FILM COATED ORAL
Qty: 90 TABLET | Refills: 3 | Status: SHIPPED | OUTPATIENT
Start: 2023-01-06

## 2023-01-18 DIAGNOSIS — E78.2 MIXED HYPERLIPIDEMIA: ICD-10-CM

## 2023-01-18 RX ORDER — ROSUVASTATIN CALCIUM 10 MG/1
10 TABLET, COATED ORAL NIGHTLY
Qty: 90 TABLET | Refills: 3 | Status: SHIPPED | OUTPATIENT
Start: 2023-01-18

## 2023-01-25 DIAGNOSIS — E11.42 DIABETIC PERIPHERAL NEUROPATHY: ICD-10-CM

## 2023-01-25 DIAGNOSIS — E11.51 TYPE 2 DIABETES MELLITUS WITH DIABETIC PERIPHERAL ANGIOPATHY WITHOUT GANGRENE, WITHOUT LONG-TERM CURRENT USE OF INSULIN: ICD-10-CM

## 2023-01-25 RX ORDER — PREGABALIN 50 MG/1
CAPSULE ORAL
Qty: 270 CAPSULE | Refills: 0 | Status: SHIPPED | OUTPATIENT
Start: 2023-01-25

## 2023-01-25 RX ORDER — LANCETS
1 EACH MISCELLANEOUS EVERY MORNING
Qty: 100 EACH | Refills: 3 | Status: SHIPPED | OUTPATIENT
Start: 2023-01-25

## 2023-01-25 NOTE — TELEPHONE ENCOUNTER
Caller: Anthony Jacinto    Relationship: Self    Best call back number: [phone]    Requested Prescriptions:   Requested Prescriptions     Pending Prescriptions Disp Refills   • Accu-Chek Softclix Lancets lancets 100 each      Si each by Other route Every Morning. Use as instructed   • glucose blood test strip 100 each 0     Si each by Other route Every Morning. DX:E11.9   • pregabalin (Lyrica) 50 MG capsule 270 capsule 0     Si bid for 1 week if needed increase to tid        Pharmacy where request should be sent: [id number] - [place] [address] AT [facility] - [phone]  - [phone] FX     Additional details provided by patient: LESS THAN 3 DAYS    Does the patient have less than a 3 day supply:  [x] Yes  [] No    Would you like a call back once the refill request has been completed: [] Yes [] No    If the office needs to give you a call back, can they leave a voicemail: [] Yes [] No    Ricardo Vanegas   23 13:58 EST

## 2023-04-03 DIAGNOSIS — E78.2 MIXED HYPERLIPIDEMIA: ICD-10-CM

## 2023-04-03 DIAGNOSIS — E66.3 OVERWEIGHT (BMI 25.0-29.9): ICD-10-CM

## 2023-04-03 DIAGNOSIS — E11.51 TYPE 2 DIABETES MELLITUS WITH DIABETIC PERIPHERAL ANGIOPATHY WITHOUT GANGRENE, WITHOUT LONG-TERM CURRENT USE OF INSULIN: Primary | ICD-10-CM

## 2023-04-03 DIAGNOSIS — E29.1 TESTICULAR HYPOGONADISM: ICD-10-CM

## 2023-04-03 DIAGNOSIS — E55.9 VITAMIN D DEFICIENCY: ICD-10-CM

## 2023-04-28 NOTE — PROGRESS NOTES
The ABCs of the Annual Wellness Visit  Subsequent Medicare Wellness Visit    Subjective    Anthony Jacinto is a 57 y.o. male who presents for a Subsequent Medicare Wellness Visit.  Going through a divorce, happiest he has been in years, currently staying with mother.     The following portions of the patient's history were reviewed and   updated as appropriate: allergies, current medications, past family history, past medical history, past social history, past surgical history and problem list.    Compared to one year ago, the patient feels his physical   health is better.    Compared to one year ago, the patient feels his mental   health is better.    Recent Hospitalizations:  He was not admitted to the hospital during the last year.       Current Medical Providers:  Patient Care Team:  Linda Stout MD as PCP - General (Family Medicine)  Kaylin Monroe as Technologist    Outpatient Medications Prior to Visit   Medication Sig Dispense Refill   • Accu-Chek Softclix Lancets lancets 1 each by Other route Every Morning. Use as instructed 100 each 3   • albuterol sulfate  (90 Base) MCG/ACT inhaler Inhale 2 puffs Every 4 (Four) Hours As Needed.     • aspirin 81 MG EC tablet Take 1 tablet by mouth Daily. 30 tablet 12   • Cholecalciferol (Vitamin D3) 1.25 MG (32590 UT) capsule 1 CAPSULE EVERY OTHER WEEK. 6 capsule 3   • glucose blood test strip 1 each by Other route Every Morning. DX:E11.9 100 each 3   • Jardiance 25 MG tablet tablet TAKE 1 TABLET BY MOUTH DAILY 90 tablet 3   • lisinopril (PRINIVIL,ZESTRIL) 20 MG tablet Take 1 tablet by mouth Daily. 90 tablet 3   • metFORMIN (GLUCOPHAGE) 500 MG tablet Take 1 tablet by mouth Daily With Breakfast. 90 tablet 3   • montelukast (SINGULAIR) 10 MG tablet TAKE 1 TABLET BY MOUTH EVERY NIGHT 90 tablet 3   • ondansetron (Zofran) 4 MG tablet Take 1 tablet by mouth Every 8 (Eight) Hours As Needed for Nausea or Vomiting. 30 tablet 2   • pantoprazole (PROTONIX) 40 MG  EC tablet Take 1 tablet by mouth Daily. 90 tablet 3   • pregabalin (Lyrica) 50 MG capsule 1 bid for 1 week if needed increase to tid 270 capsule 0   • rosuvastatin (CRESTOR) 10 MG tablet TAKE 1 TABLET BY MOUTH EVERY NIGHT 90 tablet 3   • fluticasone (FLONASE) 50 MCG/ACT nasal spray SHAKE LIQUID AND USE 2 SPRAYS IN EACH NOSTRIL DAILY 48 g 3     No facility-administered medications prior to visit.       No opioid medication identified on active medication list. I have reviewed chart for other potential  high risk medication/s and harmful drug interactions in the elderly.          Aspirin is on active medication list. Aspirin use is indicated based on review of current medical condition/s. Pros and cons of this therapy have been discussed today. Benefits of this medication outweigh potential harm.  Patient has been encouraged to continue taking this medication.  .      Patient Active Problem List   Diagnosis   • Hypertension   • Delayed gastric emptying   • Hyperlipidemia   • Vitamin D deficiency   • Sicca syndrome   • Insomnia   • Chronic pain syndrome   • Tinnitus   • TMJ dysfunction   • GERD (gastroesophageal reflux disease)   • Asthma   • Depression with anxiety   • Multilevel degenerative disc disease   • Tear film insufficiency   • Vertigo   • Hearing loss, bilateral   • History of CVA (cerebrovascular accident)   • RUDOLPH (obstructive sleep apnea)   • Memory impairment   • PFO (patent foramen ovale)   • Prolonged QT interval   • Hiatal hernia   • Sinus congestion   • Allergic rhinitis   • Deviated nasal septum   • Chronic sinusitis   • Sjogren's syndrome   • History of depression   • Type 2 diabetes mellitus with diabetic neuropathy, without long-term current use of insulin   • Medicare annual wellness visit, subsequent   • Overweight (BMI 25.0-29.9)   • Former smoker     Advance Care Planning   Advance Care Planning     Advance Directive is not on file.  ACP discussion was held with the patient during this visit.  "Patient does not have an advance directive, information provided.     Objective    Vitals:    23 1029   BP: 124/74   BP Location: Right arm   Patient Position: Sitting   Cuff Size: Adult   Pulse: 72   Resp: 18   Temp: 97.8 °F (36.6 °C)   TempSrc: Temporal   SpO2: 99%  Comment: room air   Weight: 72.7 kg (160 lb 6 oz)   Height: 175.3 cm (69\")   PainSc:   3   PainLoc: Back     Estimated body mass index is 23.68 kg/m² as calculated from the following:    Height as of this encounter: 175.3 cm (69\").    Weight as of this encounter: 72.7 kg (160 lb 6 oz).      ATTENTION  What is the year: correct  What is the month of the year: correct  What is the day of the week?: correct  What is the date?: correct  MEMORY  Repeat address three times, only score third attempt: [address], [place]: 7  HOW MANY ANIMALS DID THE PATIENT NAME  Verbal Fluency -- Animal Names (0-25): 22+  CLOCK DRAWING  Clock Drawing: All Correct  MEMORY RECALL  Tell me what you remember about that name and address we were repeating at the beginnin  ACE TOTAL SCORE  Total ACE Score - <25/30 strongly suggests cognitive impairment; <21/30 almost certainly shows dementia: 30      Does the patient have evidence of cognitive impairment? No          HEALTH RISK ASSESSMENT    Smoking Status:  Social History     Tobacco Use   Smoking Status Former   • Packs/day: 1.50   • Years: 15.00   • Pack years: 22.50   • Types: Cigarettes   • Start date: 1974   • Quit date: 1989   • Years since quittin.3   Smokeless Tobacco Never     Alcohol Consumption:  Social History     Substance and Sexual Activity   Alcohol Use No     Fall Risk Screen:    JOSELIN Fall Risk Assessment was completed, and patient is at LOW risk for falls.Assessment completed on:2023    Depression Screenin/1/2023    10:32 AM   PHQ-2/PHQ-9 Depression Screening   Little Interest or Pleasure in Doing Things 0-->not at all   Feeling Down, Depressed " or Hopeless 0-->not at all   PHQ-9: Brief Depression Severity Measure Score 0       Health Habits and Functional and Cognitive Screenin/1/2023    10:00 AM   Functional & Cognitive Status   Do you have difficulty preparing food and eating? No   Do you have difficulty bathing yourself, getting dressed or grooming yourself? No   Do you have difficulty using the toilet? No   Do you have difficulty moving around from place to place? No   Do you have trouble with steps or getting out of a bed or a chair? No   Current Diet Well Balanced Diet   Dental Exam Up to date   Eye Exam Up to date   Exercise (times per week) 7 times per week   Current Exercises Include Walking   Do you need help using the phone?  No   Are you deaf or do you have serious difficulty hearing?  No   Do you need help with transportation? No   Do you need help shopping? No   Do you need help preparing meals?  No   Do you need help with housework?  No   Do you need help with laundry? No   Do you need help taking your medications? No   Do you need help managing money? No   Do you ever drive or ride in a car without wearing a seat belt? No   Have you felt unusual stress, anger or loneliness in the last month? No   If you need help, do you have trouble finding someone available to you? No   Have you been bothered in the last four weeks by sexual problems? No   Do you have difficulty concentrating, remembering or making decisions? No       Age-appropriate Screening Schedule:  Refer to the list below for future screening recommendations based on patient's age, sex and/or medical conditions. Orders for these recommended tests are listed in the plan section. The patient has been provided with a written plan.    Health Maintenance   Topic Date Due   • Hepatitis B (1 of 3 - 3-dose series) Never done   • TDAP/TD VACCINES (1 - Tdap) Never done   • ZOSTER VACCINE (1 of 2) Never done   • HEPATITIS C SCREENING  Never done   • Pneumococcal Vaccine 0-64 (2 - PCV)  02/17/2021   • COVID-19 Vaccine (3 - Booster for Moderna series) 06/30/2021   • DIABETIC FOOT EXAM  10/08/2022   • HEMOGLOBIN A1C  02/08/2023   • INFLUENZA VACCINE  08/01/2023   • LIPID PANEL  08/08/2023   • DIABETIC EYE EXAM  08/10/2023   • URINE MICROALBUMIN  08/26/2023   • ANNUAL WELLNESS VISIT  05/01/2024   • COLORECTAL CANCER SCREENING  10/06/2026                  CMS Preventative Services Quick Reference  Risk Factors Identified During Encounter  Immunizations Discussed/Encouraged: Tdap, Hepatitis B Vaccine/Series, Prevnar 20 (Pneumococcal 20-valent conjugate), Shingrix and COVID19  The above risks/problems have been discussed with the patient.  Pertinent information has been shared with the patient in the After Visit Summary.  An After Visit Summary and PPPS were made available to the patient.    Follow Up:   Next Medicare Wellness visit to be scheduled in 1 year.       Additional E&M Note during same encounter follows:  Patient has multiple medical problems which are significant and separately identifiable that require additional work above and beyond the Medicare Wellness Visit.      Chief Complaint  Medicare Wellness-subsequent and Diabetes    Subjective        HPI  Anthony Jacinto is also being seen today for diabetes.  Patient was last seen in August, has canceled a couple appointments since that time.  Patient had additional labs at [facility] including vitamin D, testosterone, lipid panel, and CMP however these results were not received, we had called again today for those records.    Diabetes  Patient does check blood sugar at home 1 times daily.  Per patient fasting blood sugars range between 100-122.  Associated symptoms include {Neuropathy  Per patient current diet is Well Balanced.  Patient does avoid concentrated sweets.  Patient does see podiatry. Dr. Westfall in [facility].   Patient see'anthony Singh in [facility] for diabetic eye exam and last eye exam was within the  "last year.             Objective   Vital Signs:  /74 (BP Location: Right arm, Patient Position: Sitting, Cuff Size: Adult)   Pulse 72   Temp 97.8 °F (36.6 °C) (Temporal)   Resp 18   Ht 175.3 cm (69\")   Wt 72.7 kg (160 lb 6 oz)   SpO2 99% Comment: room air  BMI 23.68 kg/m²     Physical Exam  Vitals and nursing note reviewed.   Constitutional:       General: He is not in acute distress.     Appearance: Normal appearance. He is well-developed and normal weight. He is not ill-appearing.   Eyes:      Conjunctiva/sclera: Conjunctivae normal.      Pupils: Pupils are equal, round, and reactive to light.   Cardiovascular:      Rate and Rhythm: Normal rate and regular rhythm.      Heart sounds: No murmur heard.  Pulmonary:      Effort: Pulmonary effort is normal.      Breath sounds: No wheezing.   Musculoskeletal:         General: Normal range of motion.      Cervical back: Normal range of motion.   Skin:     General: Skin is warm and dry.   Neurological:      Mental Status: He is alert and oriented to person, place, and time.   Psychiatric:         Mood and Affect: Mood normal.         Behavior: Behavior normal.         Thought Content: Thought content normal.         Judgment: Judgment normal.                         Assessment and Plan   Diagnoses and all orders for this visit:    1. Medicare annual wellness visit, subsequent (Primary)    2. Type 2 diabetes mellitus with diabetic neuropathy, without long-term current use of insulin  -     Comprehensive Metabolic Panel; Future  -     Hemoglobin A1c; Future    3. Former smoker    4. Need for pneumococcal 20-valent conjugate vaccination  -     Cancel: Pneumococcal Conjugate Vaccine 20-Valent All    5. Allergic rhinitis, unspecified seasonality, unspecified trigger  -     fluticasone (FLONASE) 50 MCG/ACT nasal spray; 2 sprays by Each Nare route Daily. Shake well before using.  Dispense: 48 g; Refill: 3    6. Need for hepatitis C screening test  -     Hepatitis C " Antibody; Future    7. Vitamin D deficiency    8. Mixed hyperlipidemia    9. Primary hypertension    10. Multilevel degenerative disc disease    11. History of CVA (cerebrovascular accident)    The patient was counseled regarding nutrition, physical activity, healthy weight, injury prevention, immunizations and preventative health screenings.   Prevnar 20 is out of stock today advised to get along with Tdap and Shingrix at pharmacy and will also look into hepatitis B to see if he has any record of previous vaccination and consider the series if he cannot find record.  Also encouraged to obtain bivalent COVID booster.    Diabetes excelent control, A1c 5.9.  Patient has obtained control with significant lifestyle modification and weight loss.  Continue current medications concentrated sweets diet and weight management.    Hypertension at goal continue lisinopril 20 mg daily.    Hyperlipidemia on Crestor, need lipid panel result from approximately 3 weeks ago to determine if dose needs to be adjusted.    Vitamin D deficiency on cholecalciferol 50,000 units every other week, need vitamin D result and will adjust dosing if needed.    We have requested results from [facility]    Allergic rhinitis, continue Flonase and Singulair    Chronic intermittent back pain with long history of narcotic therapy, patient does continue to have flares of pain at times but for the most part is controlled with Lyrica.    r     Follow Up   Return in about 3 months (around 8/1/2023) for Recheck, diabetes (foot exam) .  Patient was given instructions and counseling regarding his condition or for health maintenance advice. Please see specific information pulled into the AVS if appropriate.

## 2023-05-01 ENCOUNTER — OFFICE VISIT (OUTPATIENT)
Dept: FAMILY MEDICINE CLINIC | Facility: CLINIC | Age: 58
End: 2023-05-01
Payer: MEDICARE

## 2023-05-01 VITALS
RESPIRATION RATE: 18 BRPM | BODY MASS INDEX: 23.76 KG/M2 | HEART RATE: 72 BPM | SYSTOLIC BLOOD PRESSURE: 124 MMHG | WEIGHT: 160.38 LBS | OXYGEN SATURATION: 99 % | HEIGHT: 69 IN | TEMPERATURE: 97.8 F | DIASTOLIC BLOOD PRESSURE: 74 MMHG

## 2023-05-01 DIAGNOSIS — E55.9 VITAMIN D DEFICIENCY: ICD-10-CM

## 2023-05-01 DIAGNOSIS — M53.9 MULTILEVEL DEGENERATIVE DISC DISEASE: ICD-10-CM

## 2023-05-01 DIAGNOSIS — E11.40 TYPE 2 DIABETES MELLITUS WITH DIABETIC NEUROPATHY, WITHOUT LONG-TERM CURRENT USE OF INSULIN: ICD-10-CM

## 2023-05-01 DIAGNOSIS — Z11.59 NEED FOR HEPATITIS C SCREENING TEST: ICD-10-CM

## 2023-05-01 DIAGNOSIS — Z23 NEED FOR PNEUMOCOCCAL 20-VALENT CONJUGATE VACCINATION: ICD-10-CM

## 2023-05-01 DIAGNOSIS — I10 PRIMARY HYPERTENSION: ICD-10-CM

## 2023-05-01 DIAGNOSIS — Z86.73 HISTORY OF CVA (CEREBROVASCULAR ACCIDENT): ICD-10-CM

## 2023-05-01 DIAGNOSIS — E78.2 MIXED HYPERLIPIDEMIA: ICD-10-CM

## 2023-05-01 DIAGNOSIS — Z87.891 FORMER SMOKER: ICD-10-CM

## 2023-05-01 DIAGNOSIS — Z00.00 MEDICARE ANNUAL WELLNESS VISIT, SUBSEQUENT: Primary | ICD-10-CM

## 2023-05-01 DIAGNOSIS — J30.9 ALLERGIC RHINITIS, UNSPECIFIED SEASONALITY, UNSPECIFIED TRIGGER: ICD-10-CM

## 2023-05-01 RX ORDER — FLUTICASONE PROPIONATE 50 MCG
2 SPRAY, SUSPENSION (ML) NASAL DAILY
Qty: 48 G | Refills: 3 | Status: SHIPPED | OUTPATIENT
Start: 2023-05-01

## 2023-07-18 ENCOUNTER — TELEPHONE (OUTPATIENT)
Dept: FAMILY MEDICINE CLINIC | Facility: CLINIC | Age: 58
End: 2023-07-18

## 2023-07-18 NOTE — TELEPHONE ENCOUNTER
Medical staff from [facility] called on behalf of Mr perla, the asked if we received a fax from them requesting to hold some meds.       [phone]

## 2023-08-03 ENCOUNTER — TELEPHONE (OUTPATIENT)
Dept: FAMILY MEDICINE CLINIC | Facility: CLINIC | Age: 58
End: 2023-08-03

## 2023-08-03 NOTE — TELEPHONE ENCOUNTER
Caller: Anthony Jacinto    Relationship: Self    Best call back number: [phone]    What is the best time to reach you: ANYTIME    Who are you requesting to speak with (clinical staff, provider,  specific staff member): CLINICAL STAFF    Do you know the name of the person who called: PATIENT     What was the call regarding: PATIENT CALLED AND SAID HE WANTED TO ASK A COUPLE OF QUESTIONS REGARDING LABS AND MEDICATION    PATIENT DECLINED TO GIVE SPECIFIC QUESTIONS TO HUB    Is it okay if the provider responds through MyChart: NO

## 2023-08-04 ENCOUNTER — TELEPHONE (OUTPATIENT)
Dept: FAMILY MEDICINE CLINIC | Facility: CLINIC | Age: 58
End: 2023-08-04

## 2023-08-04 DIAGNOSIS — E55.9 VITAMIN D DEFICIENCY: ICD-10-CM

## 2023-08-04 DIAGNOSIS — K21.9 GASTROESOPHAGEAL REFLUX DISEASE, UNSPECIFIED WHETHER ESOPHAGITIS PRESENT: ICD-10-CM

## 2023-08-04 DIAGNOSIS — E78.2 MIXED HYPERLIPIDEMIA: ICD-10-CM

## 2023-08-04 DIAGNOSIS — E29.1 TESTICULAR HYPOGONADISM: ICD-10-CM

## 2023-08-04 DIAGNOSIS — Z11.59 NEED FOR HEPATITIS C SCREENING TEST: ICD-10-CM

## 2023-08-04 DIAGNOSIS — E11.40 TYPE 2 DIABETES MELLITUS WITH DIABETIC NEUROPATHY, WITHOUT LONG-TERM CURRENT USE OF INSULIN: Primary | ICD-10-CM

## 2023-08-04 RX ORDER — PANTOPRAZOLE SODIUM 40 MG/1
40 TABLET, DELAYED RELEASE ORAL DAILY
Qty: 90 TABLET | Refills: 3 | Status: SHIPPED | OUTPATIENT
Start: 2023-08-04

## 2023-08-04 NOTE — TELEPHONE ENCOUNTER
Called and LVM on primary # on chart letting patient know that I faxed lab order to [facility] in [place].

## 2023-08-04 NOTE — TELEPHONE ENCOUNTER
Caller: CAPRI SAUNDERS    Relationship: Emergency Contact    Best call back number: [phone]     What orders are you requesting (i.e. lab or imaging): BLOOD WORK    In what timeframe would the patient need to come in: BEFORE 9/6/23 APPOINTMENT    Where will you receive your lab/imaging services: [facility]    Additional notes:

## 2023-08-04 NOTE — TELEPHONE ENCOUNTER
Returned phone call and patients wife stated he is surgery.   I asked her to let him know and if he has any other questions to please return our call and notified her we are out of the office on Thursdays.

## 2023-09-05 NOTE — PROGRESS NOTES
Chief Complaint  Diabetes and Hyperlipidemia    History of Present Illness  Anthony Jacinto presents today for diabetes and hyperlipidemia follow up.     Diabetes  Patient does check blood sugar at home 1 times daily.  Per patient fasting blood sugars range between 90's-120's.  Associated symptoms include Neuropathy  Per patient current diet is Diabetic.  Patient does avoid concentrated sweets.  Patient does see podiatry.  Patient see's [facility]  for diabetic eye exam and last eye exam was greater than 1 year ago.    Patient had foot surgery on right foot for nerve.  Surgery done on 08/04/2023 by Dr. Kay.  Patient had follow up with Dr. Kay on 09/05/2023.  Post op wound dehishance /infection  Going to wound care weekly at [facility].         Hyperlipidemia  Patient is not following a low cholesterol diet.   Currently is on statin therapy.  Patient reports is exercising.       Patient Care Team:  Linda tSout MD as PCP - General (Family Medicine)  Kaylin Monroe as Technologist  Urvashi Kay DPM (Podiatry)   Current Outpatient Medications on File Prior to Visit   Medication Sig    Accu-Chek Softclix Lancets lancets 1 each by Other route Every Morning. Use as instructed    albuterol sulfate  (90 Base) MCG/ACT inhaler Inhale 2 puffs Every 4 (Four) Hours As Needed.    aspirin 81 MG EC tablet Take 1 tablet by mouth Daily.    Cholecalciferol (Vitamin D3) 1.25 MG (22622 UT) capsule 1 CAPSULE EVERY OTHER WEEK.    fluticasone (FLONASE) 50 MCG/ACT nasal spray 2 sprays by Each Nare route Daily. Shake well before using.    glucose blood test strip 1 each by Other route Every Morning. DX:E11.9    Jardiance 25 MG tablet tablet TAKE 1 TABLET BY MOUTH DAILY    lisinopril (PRINIVIL,ZESTRIL) 20 MG tablet Take 1 tablet by mouth Daily.    metFORMIN (GLUCOPHAGE) 500 MG tablet TAKE 1 TABLET BY MOUTH EVERY DAY BEFORE BREAKFAST    montelukast (SINGULAIR) 10 MG tablet TAKE 1 TABLET BY MOUTH EVERY  "NIGHT    ondansetron (Zofran) 4 MG tablet Take 1 tablet by mouth Every 8 (Eight) Hours As Needed for Nausea or Vomiting.    pantoprazole (PROTONIX) 40 MG EC tablet Take 1 tablet by mouth Daily.    rosuvastatin (CRESTOR) 10 MG tablet TAKE 1 TABLET BY MOUTH EVERY NIGHT    [DISCONTINUED] pregabalin (Lyrica) 50 MG capsule 1 bid for 1 week if needed increase to tid     No current facility-administered medications on file prior to visit.       Objective   Vital Signs:   /68 (BP Location: Right arm, Patient Position: Sitting, Cuff Size: Adult)   Pulse 62   Temp 98.7 °F (37.1 °C) (Temporal)   Resp 18   Ht 175.3 cm (69\")   Wt 72.7 kg (160 lb 4 oz)   SpO2 99% Comment: room air  BMI 23.66 kg/m²          Physical Exam  Vitals and nursing note reviewed.   Constitutional:       General: He is not in acute distress.     Appearance: Normal appearance. He is well-developed and normal weight. He is not ill-appearing.   Eyes:      Conjunctiva/sclera: Conjunctivae normal.      Pupils: Pupils are equal, round, and reactive to light.   Cardiovascular:      Rate and Rhythm: Normal rate and regular rhythm.      Heart sounds: No murmur heard.  Pulmonary:      Effort: Pulmonary effort is normal.      Breath sounds: No wheezing.   Musculoskeletal:         General: Normal range of motion.      Cervical back: Normal range of motion.      Comments: Right foot in post op shoe   Skin:     General: Skin is warm and dry.   Neurological:      Mental Status: He is alert and oriented to person, place, and time.   Psychiatric:         Mood and Affect: Mood normal.         Behavior: Behavior normal.         Thought Content: Thought content normal.         Judgment: Judgment normal.          No visits with results within 1 Day(s) from this visit.   Latest known visit with results is:   Office Visit on 08/26/2022   Component Date Value Ref Range Status    Microalbumin, Urine 08/26/2022 neg   Final    Color 08/26/2022 Yellow  Yellow, Straw, " Dark Yellow, Fariba Final    Clarity, UA 08/26/2022 Clear  Clear Final    Glucose, UA 08/26/2022 3+ (A)  Negative mg/dL Final    Bilirubin 08/26/2022 Negative  Negative Final    Ketones, UA 08/26/2022 Negative  Negative Final    Specific Gravity  08/26/2022 1.010  1.005 - 1.030 Final    Blood, UA 08/26/2022 Negative  Negative Final    pH, Urine 08/26/2022 5.0  5.0 - 8.0 Final    Protein, POC 08/26/2022 Trace (A)  Negative mg/dL Final    Urobilinogen, UA 08/26/2022 Normal  Normal, 0.2 E.U./dL Final    Leukocytes 08/26/2022 Negative  Negative Final    Nitrite, UA 08/26/2022 Negative  Negative Final       No results found for: CHOL, CHLPL, TRIG, HDL, LDL, LDLDIRECT  No results found for: TSH, K6SAVCT, J9IOFZO, THYROIDAB  No results found for: GLUCOSE, BUN, CREATININE, EGFRIFNONA, EGFRIFAFRI, BCR, K, CO2, CALCIUM, PROTENTOTREF, ALBUMIN, LABIL2, BILIRUBIN, AST, ALT  No results found for: WBC, HGB, HCT, MCV, PLT                   Assessment and Plan    Diagnoses and all orders for this visit:    1. Type 2 diabetes mellitus with diabetic neuropathy, without long-term current use of insulin (Primary)  -     Comprehensive Metabolic Panel; Future  -     Hemoglobin A1c; Future    2. Type 2 diabetes mellitus with diabetic peripheral angiopathy without gangrene, without long-term current use of insulin  -     pregabalin (Lyrica) 50 MG capsule; Take 1 capsule by mouth 3 (Three) Times a Day.  Dispense: 270 capsule; Refill: 1    3. Mixed hyperlipidemia    4. Diabetic peripheral neuropathy  -     pregabalin (Lyrica) 50 MG capsule; Take 1 capsule by mouth 3 (Three) Times a Day.  Dispense: 270 capsule; Refill: 1    5. Former smoker    6. Surgical wound breakdown, initial encounter    Diabetes, excellent control, overall he is doing very well, labs are excellent.  We will continue same medications and recheck A1c with CMP in 4 to 6 months.  He will be due for Medicare wellness in May.  He is going to schedule a diabetic eye exam.     Right foot with large wound between first and second toes dorsally.  Patient shows me pictures without undressing the wound showing progression and no sign of infection.  He is going to continue with Dr. Kay his podiatrist until complete healing of his right foot wound and then at least annually thereafter.  Advised he is due for a second shingles shot at any time.  He will obtain at his local pharmacy [facility] in [place].  Did encourage hepatitis B vaccines as well.  Patient gets labs at outside facility, I have updated care gaps.    Patient will call ahead of obtaining labs so order can be sent by fax to [facility]    Medications Discontinued During This Encounter   Medication Reason    pregabalin (Lyrica) 50 MG capsule Reorder         Follow Up     Return in about 5 months (around 2/6/2024) for Recheck, diabetes, with Labs.    Patient was given instructions and counseling regarding his condition or for health maintenance advice. Please see specific information pulled into the AVS if appropriate.

## 2023-09-06 ENCOUNTER — OFFICE VISIT (OUTPATIENT)
Dept: FAMILY MEDICINE CLINIC | Facility: CLINIC | Age: 58
End: 2023-09-06
Payer: MEDICARE

## 2023-09-06 VITALS
RESPIRATION RATE: 18 BRPM | TEMPERATURE: 98.7 F | SYSTOLIC BLOOD PRESSURE: 128 MMHG | HEART RATE: 62 BPM | OXYGEN SATURATION: 99 % | WEIGHT: 160.25 LBS | BODY MASS INDEX: 23.74 KG/M2 | HEIGHT: 69 IN | DIASTOLIC BLOOD PRESSURE: 68 MMHG

## 2023-09-06 DIAGNOSIS — E78.2 MIXED HYPERLIPIDEMIA: ICD-10-CM

## 2023-09-06 DIAGNOSIS — T81.31XA SURGICAL WOUND BREAKDOWN, INITIAL ENCOUNTER: ICD-10-CM

## 2023-09-06 DIAGNOSIS — Z87.891 FORMER SMOKER: ICD-10-CM

## 2023-09-06 DIAGNOSIS — E11.51 TYPE 2 DIABETES MELLITUS WITH DIABETIC PERIPHERAL ANGIOPATHY WITHOUT GANGRENE, WITHOUT LONG-TERM CURRENT USE OF INSULIN: ICD-10-CM

## 2023-09-06 DIAGNOSIS — E11.42 DIABETIC PERIPHERAL NEUROPATHY: ICD-10-CM

## 2023-09-06 DIAGNOSIS — E11.40 TYPE 2 DIABETES MELLITUS WITH DIABETIC NEUROPATHY, WITHOUT LONG-TERM CURRENT USE OF INSULIN: Primary | ICD-10-CM

## 2023-09-06 RX ORDER — PREGABALIN 50 MG/1
50 CAPSULE ORAL 3 TIMES DAILY
Qty: 270 CAPSULE | Refills: 1 | Status: SHIPPED | OUTPATIENT
Start: 2023-09-06

## 2023-10-17 DIAGNOSIS — E78.2 MIXED HYPERLIPIDEMIA: ICD-10-CM

## 2023-10-18 RX ORDER — ROSUVASTATIN CALCIUM 10 MG/1
10 TABLET, COATED ORAL NIGHTLY
Qty: 90 TABLET | Refills: 3 | Status: SHIPPED | OUTPATIENT
Start: 2023-10-18

## 2023-11-16 DIAGNOSIS — E11.51 TYPE 2 DIABETES MELLITUS WITH DIABETIC PERIPHERAL ANGIOPATHY WITHOUT GANGRENE, WITHOUT LONG-TERM CURRENT USE OF INSULIN: ICD-10-CM

## 2023-11-16 RX ORDER — LISINOPRIL 20 MG/1
20 TABLET ORAL DAILY
Qty: 90 TABLET | Refills: 3 | Status: SHIPPED | OUTPATIENT
Start: 2023-11-16

## 2023-12-24 DIAGNOSIS — E11.51 TYPE 2 DIABETES MELLITUS WITH DIABETIC PERIPHERAL ANGIOPATHY WITHOUT GANGRENE, WITHOUT LONG-TERM CURRENT USE OF INSULIN: ICD-10-CM

## 2023-12-27 RX ORDER — EMPAGLIFLOZIN 25 MG/1
TABLET, FILM COATED ORAL
Qty: 90 TABLET | Refills: 0 | Status: SHIPPED | OUTPATIENT
Start: 2023-12-27

## 2023-12-28 NOTE — TELEPHONE ENCOUNTER
Patient gets labs at [facility], excellent control in August, next due before appointment February

## 2024-01-12 DIAGNOSIS — E11.51 TYPE 2 DIABETES MELLITUS WITH DIABETIC PERIPHERAL ANGIOPATHY WITHOUT GANGRENE, WITHOUT LONG-TERM CURRENT USE OF INSULIN: ICD-10-CM

## 2024-01-12 RX ORDER — LANCETS
EACH MISCELLANEOUS
Qty: 100 EACH | Refills: 3 | Status: SHIPPED | OUTPATIENT
Start: 2024-01-12

## 2024-01-12 RX ORDER — BLOOD SUGAR DIAGNOSTIC
STRIP MISCELLANEOUS
Qty: 100 EACH | Refills: 3 | Status: SHIPPED | OUTPATIENT
Start: 2024-01-12

## 2024-02-01 ENCOUNTER — TELEPHONE (OUTPATIENT)
Dept: FAMILY MEDICINE CLINIC | Facility: CLINIC | Age: 59
End: 2024-02-01
Payer: MEDICARE

## 2024-02-01 NOTE — TELEPHONE ENCOUNTER
Caller: Anthony Jacinto    Relationship: Self    Best call back number: [phone]     What orders are you requesting (i.e. lab or imaging): BLOOD WORK    In what timeframe would the patient need to come in: ASAP    Where will you receive your lab/imaging services: [facility]

## 2024-02-27 ENCOUNTER — TELEPHONE (OUTPATIENT)
Dept: FAMILY MEDICINE CLINIC | Facility: CLINIC | Age: 59
End: 2024-02-27
Payer: MEDICARE

## 2024-02-27 NOTE — TELEPHONE ENCOUNTER
Yes, labs have been received and reviewed:  A1c is minimally elevated at 5.7 and glucose minimally elevated at 109.  Potassium is high end of normal at 4.9.  BUN is 18 creatinine 1.11 with an estimated glomerular filtration rate of 77.  Remainder of CMP completely within normal limits.  Patient is scheduled on 3/8/2024 for follow-up on diabetes and labs.    (Last full set lab was done in August 2023 it also included a lipid panel and a testosterone level)

## 2024-02-27 NOTE — TELEPHONE ENCOUNTER
Caller: Anthony Jacinto    Relationship: Self    Best call back number:     Anthony Jacinto (Self) [phone] (Mobile)       Caller requesting test results:     What test was performed: LABS     When was the test performed: ABOUT A WEEK AGO     Where was the test performed: [facility]     Additional notes:   HAVE THEY BEEN  SENT TO YOUR OFFICE

## 2024-03-04 NOTE — PROGRESS NOTES
Chief Complaint  Diabetes    History of Present Illness  Anthony Jacinto presents today for diabetes follow up    Diabetes  Patient does check blood sugar at home 1 times daily.  Per patient fasting blood sugars range between 100 to 130.  Associated symptoms include Neuropathy  Per patient current diet is Variety of foods.  Patient does not avoid concentrated sweets.  Patient does see podiatry.  Patient see's [facility] for diabetic eye exam and last eye exam was 08/2023.  Patient reports they are taking medications as prescribed and they are not having side effects.    Influenza immunization was not given due to patient refusal.     Having more pain in neck and back pain. Did have neck surgery, with Dr Christie on 6/15/2020 but not currently following with him. Was told would likely need additional surgery in the future.  Patient was on chronic high-dose narcotic therapy for years but has successfully discontinued and has not needed narcotics for several years.    Patient Care Team:  Linda Stout MD as PCP - General (Family Medicine)  Kaylin Monroe as Technologist  Urvashi Kay DPM (Podiatry)   Current Outpatient Medications on File Prior to Visit   Medication Sig    Accu-Chek Softclix Lancets lancets USE TO TEST BLOOD SUGAR ONCE DAILY IN THE MORNING.    albuterol sulfate  (90 Base) MCG/ACT inhaler Inhale 2 puffs Every 4 (Four) Hours As Needed.    aspirin 81 MG EC tablet Take 1 tablet by mouth Daily.    fluticasone (FLONASE) 50 MCG/ACT nasal spray 2 sprays by Each Nare route Daily. Shake well before using.    glucose blood (Accu-Chek Fanta Plus) test strip USE TO TEST BLOOD SUGAR ONCE DAILY IN THE MORNING    lisinopril (PRINIVIL,ZESTRIL) 20 MG tablet TAKE 1 TABLET BY MOUTH EVERY DAY    metFORMIN (GLUCOPHAGE) 500 MG tablet TAKE 1 TABLET BY MOUTH EVERY DAY BEFORE BREAKFAST    montelukast (SINGULAIR) 10 MG tablet TAKE 1 TABLET BY MOUTH EVERY NIGHT    pantoprazole (PROTONIX) 40 MG EC  "tablet Take 1 tablet by mouth Daily.    rosuvastatin (CRESTOR) 10 MG tablet TAKE 1 TABLET BY MOUTH EVERY NIGHT    ondansetron (Zofran) 4 MG tablet Take 1 tablet by mouth Every 8 (Eight) Hours As Needed for Nausea or Vomiting. (Patient not taking: Reported on 3/8/2024)     No current facility-administered medications on file prior to visit.       Objective   Vital Signs:   /70 (BP Location: Right arm, Patient Position: Sitting, Cuff Size: Adult)   Pulse 70   Temp 97.5 °F (36.4 °C) (Temporal)   Resp 18   Ht 175.3 cm (69\")   Wt 78.9 kg (174 lb)   SpO2 97%   BMI 25.70 kg/m²    BP Readings from Last 3 Encounters:   03/08/24 108/70   09/06/23 128/68   05/01/23 124/74     Wt Readings from Last 3 Encounters:   03/08/24 78.9 kg (174 lb)   09/06/23 72.7 kg (160 lb 4 oz)   05/01/23 72.7 kg (160 lb 6 oz)         Physical Exam  Vitals and nursing note reviewed.   Constitutional:       General: He is not in acute distress.     Appearance: Normal appearance. He is well-developed and normal weight. He is not ill-appearing.   Eyes:      Conjunctiva/sclera: Conjunctivae normal.      Pupils: Pupils are equal, round, and reactive to light.   Cardiovascular:      Rate and Rhythm: Normal rate and regular rhythm.      Heart sounds: No murmur heard.  Pulmonary:      Effort: Pulmonary effort is normal.      Breath sounds: No wheezing.   Musculoskeletal:         General: Normal range of motion.      Cervical back: Normal range of motion.   Skin:     General: Skin is warm and dry.   Neurological:      Mental Status: He is alert and oriented to person, place, and time.   Psychiatric:         Mood and Affect: Mood normal.         Behavior: Behavior normal.         Thought Content: Thought content normal.         Judgment: Judgment normal.            No visits with results within 1 Day(s) from this visit.   Latest known visit with results is:   Office Visit on 08/26/2022   Component Date Value Ref Range Status    Microalbumin, Urine " 08/26/2022 neg   Final    Color 08/26/2022 Yellow  Yellow, Straw, Dark Yellow, Fariba Final    Clarity, UA 08/26/2022 Clear  Clear Final    Glucose, UA 08/26/2022 3+ (A)  Negative mg/dL Final    Bilirubin 08/26/2022 Negative  Negative Final    Ketones, UA 08/26/2022 Negative  Negative Final    Specific Gravity  08/26/2022 1.010  1.005 - 1.030 Final    Blood, UA 08/26/2022 Negative  Negative Final    pH, Urine 08/26/2022 5.0  5.0 - 8.0 Final    Protein, POC 08/26/2022 Trace (A)  Negative mg/dL Final    Urobilinogen, UA 08/26/2022 Normal  Normal, 0.2 E.U./dL Final    Leukocytes 08/26/2022 Negative  Negative Final    Nitrite, UA 08/26/2022 Negative  Negative Final       A1c is minimally elevated at 5.7 and glucose minimally elevated at 109.  Potassium is high end of normal at 4.9.  BUN is 18 creatinine 1.11 with an estimated glomerular filtration rate of 77.                    Assessment and Plan    Diagnoses and all orders for this visit:    1. Type 2 diabetes mellitus with diabetic neuropathy, without long-term current use of insulin (Primary)    2. Overweight (BMI 25.0-29.9)    3. Former smoker    4. Diabetic peripheral neuropathy  -     pregabalin (Lyrica) 50 MG capsule; Take 1 capsule by mouth 2 (Two) Times a Day.  Dispense: 180 capsule; Refill: 1      Diabetes, excellent control.  A1c has remained stable at 5.7 from August 2023 to repeat on February 22, 2024. Home readings 100 to 136.   He is not having any side effects with Jardiance or metformin. Will continue without change.   Renewing Lyrica for chronic diabetic neuropathy.    Patient does have history of cervical disc disease and surgery with Dr. Forbes.  He declines referral back to the back surgeon, pain management, or physical therapy at this time.  We will continue current medications including Lyrica which is prescribed for his right foot pain.        Medications Discontinued During This Encounter   Medication Reason    pregabalin (Lyrica) 50 MG  capsule Reorder         Follow Up     Return in about 6 months (around 9/8/2024) for  Medicare Wellness, Recheck, diabetes, cholesterol with labs.  Patient will call ahead to get lab order to be done prior to appointment at [facility] in [place]    Patient was given instructions and counseling regarding his condition or for health maintenance advice. Please see specific information pulled into the AVS if appropriate.

## 2024-03-08 ENCOUNTER — OFFICE VISIT (OUTPATIENT)
Dept: FAMILY MEDICINE CLINIC | Facility: CLINIC | Age: 59
End: 2024-03-08
Payer: MEDICARE

## 2024-03-08 VITALS
DIASTOLIC BLOOD PRESSURE: 70 MMHG | OXYGEN SATURATION: 97 % | RESPIRATION RATE: 18 BRPM | HEIGHT: 69 IN | HEART RATE: 70 BPM | WEIGHT: 174 LBS | BODY MASS INDEX: 25.77 KG/M2 | SYSTOLIC BLOOD PRESSURE: 108 MMHG | TEMPERATURE: 97.5 F

## 2024-03-08 DIAGNOSIS — E66.3 OVERWEIGHT (BMI 25.0-29.9): ICD-10-CM

## 2024-03-08 DIAGNOSIS — E11.42 DIABETIC PERIPHERAL NEUROPATHY: ICD-10-CM

## 2024-03-08 DIAGNOSIS — E11.40 TYPE 2 DIABETES MELLITUS WITH DIABETIC NEUROPATHY, WITHOUT LONG-TERM CURRENT USE OF INSULIN: Primary | ICD-10-CM

## 2024-03-08 DIAGNOSIS — Z87.891 FORMER SMOKER: ICD-10-CM

## 2024-03-08 RX ORDER — PREGABALIN 50 MG/1
50 CAPSULE ORAL 2 TIMES DAILY
Qty: 180 CAPSULE | Refills: 1 | Status: SHIPPED | OUTPATIENT
Start: 2024-03-08

## 2024-03-22 ENCOUNTER — TELEPHONE (OUTPATIENT)
Dept: FAMILY MEDICINE CLINIC | Facility: CLINIC | Age: 59
End: 2024-03-22
Payer: MEDICARE

## 2024-03-22 NOTE — TELEPHONE ENCOUNTER
Jenn with [facility] in [place] called and said that she needs the signed office visit from the 3/8/24 for Anthony's diabetic shoes.  Please contact her at  or fax to .  Thanks Lakeshia

## 2024-03-25 DIAGNOSIS — E11.51 TYPE 2 DIABETES MELLITUS WITH DIABETIC PERIPHERAL ANGIOPATHY WITHOUT GANGRENE, WITHOUT LONG-TERM CURRENT USE OF INSULIN: ICD-10-CM

## 2024-03-25 RX ORDER — EMPAGLIFLOZIN 25 MG/1
TABLET, FILM COATED ORAL
Qty: 90 TABLET | Refills: 0 | Status: SHIPPED | OUTPATIENT
Start: 2024-03-25

## 2024-03-26 DIAGNOSIS — E55.9 VITAMIN D DEFICIENCY: ICD-10-CM

## 2024-03-29 ENCOUNTER — TELEPHONE (OUTPATIENT)
Dept: FAMILY MEDICINE CLINIC | Facility: CLINIC | Age: 59
End: 2024-03-29
Payer: MEDICARE

## 2024-04-03 ENCOUNTER — TELEPHONE (OUTPATIENT)
Dept: FAMILY MEDICINE CLINIC | Facility: CLINIC | Age: 59
End: 2024-04-03
Payer: MEDICARE

## 2024-04-03 NOTE — TELEPHONE ENCOUNTER
Caller: Anthony Jacinto    Relationship: Self    Best call back number: [phone]    What was the call regarding: PATIENT STATES THAT HE WOULD LIKE TO SPEAK WITH DR. MULTANI'S ASSISTANT ASAP. PATIENT LEFT NO OTHER DETAILS.

## 2024-04-03 NOTE — TELEPHONE ENCOUNTER
Patient called in stating [facility] did not received faxed office note from 03/08/2024. Resending electronically and physically.

## 2024-04-25 DIAGNOSIS — J30.9 ALLERGIC RHINITIS, UNSPECIFIED SEASONALITY, UNSPECIFIED TRIGGER: ICD-10-CM

## 2024-04-25 RX ORDER — FLUTICASONE PROPIONATE 50 MCG
SPRAY, SUSPENSION (ML) NASAL
Qty: 48 G | Refills: 3 | Status: SHIPPED | OUTPATIENT
Start: 2024-04-25

## 2024-06-24 DIAGNOSIS — E11.51 TYPE 2 DIABETES MELLITUS WITH DIABETIC PERIPHERAL ANGIOPATHY WITHOUT GANGRENE, WITHOUT LONG-TERM CURRENT USE OF INSULIN: ICD-10-CM

## 2024-06-24 RX ORDER — EMPAGLIFLOZIN 25 MG/1
TABLET, FILM COATED ORAL
Qty: 90 TABLET | Refills: 0 | Status: SHIPPED | OUTPATIENT
Start: 2024-06-24

## 2024-07-02 DIAGNOSIS — E11.51 TYPE 2 DIABETES MELLITUS WITH DIABETIC PERIPHERAL ANGIOPATHY WITHOUT GANGRENE, WITHOUT LONG-TERM CURRENT USE OF INSULIN: ICD-10-CM

## 2024-07-02 RX ORDER — EMPAGLIFLOZIN 25 MG/1
TABLET, FILM COATED ORAL
Qty: 90 TABLET | Refills: 0 | OUTPATIENT
Start: 2024-07-02

## 2024-07-23 DIAGNOSIS — K21.9 GASTROESOPHAGEAL REFLUX DISEASE, UNSPECIFIED WHETHER ESOPHAGITIS PRESENT: ICD-10-CM

## 2024-07-23 RX ORDER — PANTOPRAZOLE SODIUM 40 MG/1
40 TABLET, DELAYED RELEASE ORAL DAILY
Qty: 90 TABLET | Refills: 3 | Status: SHIPPED | OUTPATIENT
Start: 2024-07-23

## 2024-08-02 DIAGNOSIS — E11.51 TYPE 2 DIABETES MELLITUS WITH DIABETIC PERIPHERAL ANGIOPATHY WITHOUT GANGRENE, WITHOUT LONG-TERM CURRENT USE OF INSULIN: ICD-10-CM

## 2024-08-07 ENCOUNTER — TELEPHONE (OUTPATIENT)
Dept: FAMILY MEDICINE CLINIC | Facility: CLINIC | Age: 59
End: 2024-08-07
Payer: MEDICARE

## 2024-08-07 DIAGNOSIS — I10 PRIMARY HYPERTENSION: ICD-10-CM

## 2024-08-07 DIAGNOSIS — E78.2 MIXED HYPERLIPIDEMIA: ICD-10-CM

## 2024-08-07 DIAGNOSIS — E11.51 TYPE 2 DIABETES MELLITUS WITH DIABETIC PERIPHERAL ANGIOPATHY WITHOUT GANGRENE, WITHOUT LONG-TERM CURRENT USE OF INSULIN: Primary | ICD-10-CM

## 2024-08-07 DIAGNOSIS — E55.9 VITAMIN D DEFICIENCY: ICD-10-CM

## 2024-08-07 NOTE — TELEPHONE ENCOUNTER
Patient requesting to obtain labs at [facility] prior to upcoming appt. Please let me know which labs you would like placed.     [facility] fax  [phone]

## 2024-09-04 ENCOUNTER — TELEPHONE (OUTPATIENT)
Dept: FAMILY MEDICINE CLINIC | Facility: CLINIC | Age: 59
End: 2024-09-04
Payer: MEDICARE

## 2024-09-04 NOTE — TELEPHONE ENCOUNTER
"Per Dr. Stout,  \"Received labs today, please contact [facility] and confirm they are also running a lipid panel, A1c, and vitamin D level.   I have received results from the following labs dated 8/3/2024 from [facility]:   Sodium 140, potassium 4.6, glucose 101, BUN 20 creatinine 1.14, EGFR 75, remainder of CMP within normal limits, CBC with white count of 6.9, hemoglobin 13.8, hematocrit 42.0 remainder of CBC including differential within normal limits.\"    Called and spoke with Jovanna in the lab and she states they now have all of the results back, she is sending over now.  "

## 2024-09-06 NOTE — PROGRESS NOTES
Subjective   The ABCs of the Annual Wellness Visit  Medicare Wellness Visit    Anthony Jacinto is a 59 y.o. patient who presents for a Medicare Wellness Visit.    The following portions of the patient's history were reviewed and   updated as appropriate: allergies, current medications, past family history, past medical history, past social history, past surgical history, and problem list.    Compared to one year ago, the patient's physical   health is the same.  Compared to one year ago, the patient's mental   health is better.    Recent Hospitalizations:  He was not admitted to the hospital during the last year.     Current Medical Providers:  Patient Care Team:  Linda Stout MD as PCP - General (Family Medicine)  Kaylin Monroe as Technologist  Urvashi Kay DPM (Podiatry)    Outpatient Medications Prior to Visit   Medication Sig Dispense Refill    Accu-Chek Softclix Lancets lancets USE TO TEST BLOOD SUGAR ONCE DAILY IN THE MORNING. 100 each 3    aspirin 81 MG EC tablet Take 1 tablet by mouth Daily. 30 tablet 12    cholecalciferol (Vitamin D3) 1.25 MG (97321 UT) capsule TAKE 1 CAPSULE BY MOUTH EVERY OTHER WEEK 6 capsule 3    fluticasone (FLONASE) 50 MCG/ACT nasal spray INSTILL 2 SPRAYS INTO EACH NOSIL EVERY DAY, SHAKE WELL BEFORE USING 48 g 3    glucose blood (Accu-Chek Fanta Plus) test strip USE TO TEST BLOOD SUGAR ONCE DAILY IN THE MORNING 100 each 3    Jardiance 25 MG tablet tablet TAKE 1 TABLET BY MOUTH DAILY 90 tablet 0    lisinopril (PRINIVIL,ZESTRIL) 20 MG tablet TAKE 1 TABLET BY MOUTH EVERY DAY 90 tablet 3    metFORMIN (GLUCOPHAGE) 500 MG tablet TAKE 1 TABLET BY MOUTH EVERY DAY BEFORE BREAKFAST 90 tablet 3    montelukast (SINGULAIR) 10 MG tablet TAKE 1 TABLET BY MOUTH EVERY NIGHT 90 tablet 3    pantoprazole (PROTONIX) 40 MG EC tablet TAKE 1 TABLET BY MOUTH DAILY 90 tablet 3    pregabalin (Lyrica) 50 MG capsule Take 1 capsule by mouth 2 (Two) Times a Day. 180 capsule 1    rosuvastatin  (CRESTOR) 10 MG tablet TAKE 1 TABLET BY MOUTH EVERY NIGHT 90 tablet 3    albuterol sulfate  (90 Base) MCG/ACT inhaler Inhale 2 puffs Every 4 (Four) Hours As Needed. (Patient not taking: Reported on 9/10/2024)      ondansetron (Zofran) 4 MG tablet Take 1 tablet by mouth Every 8 (Eight) Hours As Needed for Nausea or Vomiting. (Patient not taking: Reported on 3/8/2024) 30 tablet 2     No facility-administered medications prior to visit.     No opioid medication identified on active medication list. I have reviewed chart for other potential  high risk medication/s and harmful drug interactions in the elderly.      Aspirin is on active medication list. Aspirin use is indicated based on review of current medical condition/s. Pros and cons of this therapy have been discussed today. Benefits of this medication outweigh potential harm.  Patient has been encouraged to continue taking this medication.  .      Patient Active Problem List   Diagnosis    Hypertension    Delayed gastric emptying    Hyperlipidemia    Vitamin D deficiency    Sicca syndrome    Insomnia    Chronic pain syndrome    Tinnitus    TMJ dysfunction    GERD (gastroesophageal reflux disease)    Asthma    Depression with anxiety    Multilevel degenerative disc disease    Tear film insufficiency    Vertigo    Hearing loss, bilateral    History of CVA (cerebrovascular accident)    RUDOLPH (obstructive sleep apnea)    Memory impairment    PFO (patent foramen ovale)    Prolonged QT interval    Hiatal hernia    Sinus congestion    Allergic rhinitis    Deviated nasal septum    Chronic sinusitis    Sjogren's syndrome    History of depression    Type 2 diabetes mellitus with diabetic neuropathy, without long-term current use of insulin    Medicare annual wellness visit, subsequent    Overweight (BMI 25.0-29.9)    Former smoker     Advance Care Planning Advance Directive is not on file.  ACP discussion was held with the patient during this visit. Patient does not  "have an advance directive, information provided.            Objective   Vitals:    09/10/24 1257   BP: 110/70   BP Location: Right arm   Patient Position: Sitting   Cuff Size: Adult   Pulse: 66   Resp: 18   Temp: 97.7 °F (36.5 °C)   TempSrc: Temporal   SpO2: 98%   Weight: 76.7 kg (169 lb 3.2 oz)   Height: 175.3 cm (69\")   PainSc: 0-No pain       Estimated body mass index is 24.99 kg/m² as calculated from the following:    Height as of this encounter: 175.3 cm (69\").    Weight as of this encounter: 76.7 kg (169 lb 3.2 oz).    BMI is >= 25 and <30. (Overweight) The following options were offered after discussion;: exercise counseling/recommendations and nutrition counseling/recommendations     ATTENTION  What is the year: correct  What is the month of the year: correct  What is the day of the week?: correct  What is the date?: correct  MEMORY  Repeat address three times, only score third attempt: Christian Mcpherson [address] [place] [address]: 7  HOW MANY ANIMALS DID THE PATIENT NAME  Verbal Fluency -- Animal Names (0-25): 17-21  CLOCK DRAWING  Clock Drawing: All Correct  MEMORY RECALL  Tell me what you remember about that name and address we were repeating at the beginnin  ACE TOTAL SCORE  Total ACE Score - <25/30 strongly suggests cognitive impairment; <21/30 almost certainly shows dementia: 26    Does the patient have evidence of cognitive impairment? No                                                                                                Health  Risk Assessment    Smoking Status:  Social History     Tobacco Use   Smoking Status Former    Current packs/day: 0.00    Average packs/day: 1.5 packs/day for 15.0 years (22.5 ttl pk-yrs)    Types: Cigarettes    Start date: 1974    Quit date: 1989    Years since quittin.7   Smokeless Tobacco Never     Alcohol Consumption:  Social History     Substance and Sexual Activity   Alcohol Use No       Fall Risk Screen  MARÍA ELENAADI Fall Risk Assessment was " completed, and patient is at LOW risk for falls.Assessment completed on:9/10/2024    Depression Screenin/10/2024    12:59 PM   PHQ-2/PHQ-9 Depression Screening   Little Interest or Pleasure in Doing Things 0-->not at all   Feeling Down, Depressed or Hopeless 0-->not at all   PHQ-9: Brief Depression Severity Measure Score 0     Health Habits and Functional and Cognitive Screenin/10/2024    12:59 PM   Functional & Cognitive Status   Do you have difficulty preparing food and eating? No   Do you have difficulty bathing yourself, getting dressed or grooming yourself? No   Do you have difficulty using the toilet? No   Do you have difficulty moving around from place to place? No   Do you have trouble with steps or getting out of a bed or a chair? No   Current Diet Well Balanced Diet   Dental Exam Not up to date   Eye Exam Up to date   Exercise (times per week) 0 times per week   Current Exercises Include No Regular Exercise   Do you need help using the phone?  No   Are you deaf or do you have serious difficulty hearing?  No   Do you need help to go to places out of walking distance? No   Do you need help shopping? No   Do you need help preparing meals?  No   Do you need help with housework?  No   Do you need help with laundry? No   Do you need help taking your medications? No   Do you need help managing money? No   Do you ever drive or ride in a car without wearing a seat belt? No   Have you felt unusual stress, anger or loneliness in the last month? No   Who do you live with? Other   If you need help, do you have trouble finding someone available to you? No   Have you been bothered in the last four weeks by sexual problems? No   Do you have difficulty concentrating, remembering or making decisions? No           Age-appropriate Screening Schedule:  Refer to the list below for future screening recommendations based on patient's age, sex and/or medical conditions. Orders for these recommended tests are  listed in the plan section. The patient has been provided with a written plan.    Health Maintenance List  Health Maintenance   Topic Date Due    Hepatitis B (1 of 3 - 19+ 3-dose series) Never done    ZOSTER VACCINE (2 of 2) 07/11/2023    DIABETIC EYE EXAM  08/10/2023    COVID-19 Vaccine (4 - 2023-24 season) 09/01/2024    INFLUENZA VACCINE  08/01/2024    HEMOGLOBIN A1C  03/03/2025    DIABETIC FOOT EXAM  03/13/2025    LIPID PANEL  09/03/2025    URINE MICROALBUMIN  09/03/2025    ANNUAL WELLNESS VISIT  09/10/2025    COLORECTAL CANCER SCREENING  10/06/2026    TDAP/TD VACCINES (2 - Td or Tdap) 05/16/2033    HEPATITIS C SCREENING  Completed    Pneumococcal Vaccine 0-64  Completed                                                                                                                                                CMS Preventative Services Quick Reference  Risk Factors Identified During Encounter  See Assesment and plan below    The above risks/problems have been discussed with the patient.  Pertinent information has been shared with the patient in the After Visit Summary.  An After Visit Summary and PPPS were made available to the patient.    Follow Up:   Next Medicare Wellness visit to be scheduled in 1 year.       Additional E&M Note during same encounter follows:  Patient has additional, significant, and separately identifiable condition(s)/problem(s) that require work above and beyond the Medicare Wellness Visit     Chief Complaint  Medicare Wellness-subsequent, Diabetes, and Hyperlipidemia    Subjective   HPI  Anthony is also being seen today for additional medical problem/s.    Diabetes  Patient does check blood sugar at home 1 times daily.  Per patient fasting blood sugars typically range between 106 to 113.in past month 98 to 136.   Associated symptoms include Neuropathy  Per patient current diet is Well Balanced.  Patient does avoid concentrated sweets.  Patient does see podiatry.  Patient see's [facility] for diabetic [facility] exam and last eye exam was 08/2023. And scheduled on 9/23/24  Patient reports they are taking medications as prescribed and they are not having side effects.  Seeing podiatrist on 9/17/24 for follow up on foot surgery removing a nerve and part of 2 bones in forefoot on 8/4/23.   Still with daily neuropathic pain but improving, Lyrica is helpful for the discomfort.     Hyperlipidemia  Patient is not following a low cholesterol diet.   Currently is on statin therapy, Crestor 10 mg daily.  Patient reports is not exercising.  Patient reports they are taking medications as prescribed and they are not having side effects.    HTN  Did run out of medication last week. Restarted lisinopril yesterday.   Do not have a blood pressure cuff.     BMI is >= 25 and <30. (Overweight) The following options were offered after discussion;: exercise counseling/recommendations and nutrition counseling/recommendations               Objective   Vital Signs:  /70 (BP Location: Right arm, Patient Position: Sitting, Cuff Size: Adult)   Pulse 66   Temp 97.7 °F (36.5 °C) (Temporal)   Resp 18   Ht 175.3 cm (69\")   Wt 76.7 kg (169 lb 3.2 oz)   SpO2 98%   BMI 24.99 kg/m²   Physical Exam  Vitals and nursing note reviewed.   Constitutional:       General: He is not in acute distress.     Appearance: Normal appearance. He is well-developed and normal weight. He is not ill-appearing.   Eyes:      Conjunctiva/sclera: Conjunctivae normal.      Pupils: Pupils are equal, round, and reactive to light.   Cardiovascular:      Rate and Rhythm: Normal rate and regular rhythm.      Heart sounds: No murmur heard.  Pulmonary:      Effort: Pulmonary effort is normal.      Breath sounds: No wheezing.   Musculoskeletal:         General: Normal range of motion.      Cervical back: Normal range of motion.   Skin:     General: Skin is warm and dry.   Neurological:      Mental Status: He is alert and oriented to person, place, and time. "   Psychiatric:         Mood and Affect: Mood normal.         Behavior: Behavior normal.         Thought Content: Thought content normal.         Judgment: Judgment normal.         The following data was reviewed by: Linda Stout MD on 09/10/2024:  labs dated 8/3/2024 from [facility]:   A1c 6.2, vitamin D level 44, Total cholesterol 124, LDL 59, HDL 47,  triglycerides 97, Sodium 140, potassium 4.6, glucose 101, BUN 20 creatinine 1.14, EGFR 75, remainder of CMP within normal limits, CBC with white count of 6.9, hemoglobin 13.8, hematocrit 42.0 remainder of CBC including differential within normal limits         Assessment and Plan               Medicare annual wellness visit, subsequent    Type 2 diabetes mellitus with diabetic peripheral angiopathy without gangrene, without long-term current use of insulin  Diabetes is stable.   Continue current treatment regimen.  Diabetes will be reassessed in 3 months  Mixed hyperlipidemia   Lipid abnormalities are stable    Plan:  Continue same medication/s without change.      Discussed medication dosage, use, side effects, and goals of treatment in detail.    Counseled patient on lifestyle modifications to help control hyperlipidemia.     Patient Treatment Goals:   LDL goal is less than 70    Followup in 6 months.  Primary hypertension  Hypertension is stable and controlled  Continue current treatment regimen.  Blood pressure will be reassessed in 3 months.  Former smoker      Orders Placed This Encounter   Procedures    Hemoglobin A1c     Standing Status:   Future     Standing Expiration Date:   9/10/2025     Order Specific Question:   Release to patient     Answer:   Routine Release [[id number]]    Comprehensive Metabolic Panel     Standing Status:   Future     Standing Expiration Date:   9/10/2025     Order Specific Question:   Release to patient     Answer:   Routine Release [[id number]]     The patient was counseled regarding nutrition,  physical activity, healthy weight, injury prevention, immunizations and preventative health screenings.  Do recommend COVID and influenza vaccinations in October, second Shingrix vaccination, and hepatitis B series.  Patient intends to get these at a local pharmacy.  Do recommend scheduling diabetic eye exam.    Discussion with Patientregarding advanced directives and end of life care was voluntarily entered by patient.  Patient has not had significant change in their medical condition in the past year.     Living Will, POST, and Advanced Care Planning form discussed at length and reviewed with patient.     I spent 16 minutes  with patient reviewing information and documenting  in the chart.      Patient states he does want CPR. Reviewed medical interventions with patient and the differences between each: Comfort, Limited and Full. Patient opted for Full. Discussed the use of antibiotics at the end of life. He chose to use antibiotics consistent with treatment goals. Discussed artificially administered nutrition, patient is aware that if he is alert and oriented they can change their mind at any time. However, they have elected to have a trial of artificial nutrition by tube for undetermined length of time for a goal of good quality of life..   Additionally, he would want withdrawal of advanced care interventions including mechanical ventilation and artificial nutrition if there is little or no chance of recovery to good quality of life.    Patient has identified his mother gautam Jacinto as his healthcare representative. Advised to discuss with healthcare representative if they can comply with wishes. Patient encouraged to have a meeting to discuss his decision regarding advanced care directives and goals of care with extended family and significant  friends  In regard to the POST form:The patient opted to complete POST while in the office and copy scanned into the chart. and advised to give to family members, place  in an easily accessible place and take with them if going to the hospital or Emergency room.             Follow Up   Return in about 6 months (around 3/10/2025) for Recheck, diabetes, with Labs.  Patient was given instructions and counseling regarding his condition or for health maintenance advice. Please see specific information pulled into the AVS if appropriate.

## 2024-09-10 ENCOUNTER — OFFICE VISIT (OUTPATIENT)
Dept: FAMILY MEDICINE CLINIC | Facility: CLINIC | Age: 59
End: 2024-09-10
Payer: MEDICARE

## 2024-09-10 VITALS
OXYGEN SATURATION: 98 % | WEIGHT: 169.2 LBS | DIASTOLIC BLOOD PRESSURE: 70 MMHG | RESPIRATION RATE: 18 BRPM | BODY MASS INDEX: 25.06 KG/M2 | SYSTOLIC BLOOD PRESSURE: 110 MMHG | HEIGHT: 69 IN | TEMPERATURE: 97.7 F | HEART RATE: 66 BPM

## 2024-09-10 DIAGNOSIS — E11.51 TYPE 2 DIABETES MELLITUS WITH DIABETIC PERIPHERAL ANGIOPATHY WITHOUT GANGRENE, WITHOUT LONG-TERM CURRENT USE OF INSULIN: ICD-10-CM

## 2024-09-10 DIAGNOSIS — E78.2 MIXED HYPERLIPIDEMIA: ICD-10-CM

## 2024-09-10 DIAGNOSIS — Z00.00 MEDICARE ANNUAL WELLNESS VISIT, SUBSEQUENT: Primary | ICD-10-CM

## 2024-09-10 DIAGNOSIS — Z87.891 FORMER SMOKER: ICD-10-CM

## 2024-09-10 DIAGNOSIS — I10 PRIMARY HYPERTENSION: ICD-10-CM

## 2024-09-10 PROCEDURE — G0439 PPPS, SUBSEQ VISIT: HCPCS | Performed by: FAMILY MEDICINE

## 2024-09-10 PROCEDURE — 99497 ADVNCD CARE PLAN 30 MIN: CPT | Performed by: FAMILY MEDICINE

## 2024-09-10 PROCEDURE — 3078F DIAST BP <80 MM HG: CPT | Performed by: FAMILY MEDICINE

## 2024-09-10 PROCEDURE — 99214 OFFICE O/P EST MOD 30 MIN: CPT | Performed by: FAMILY MEDICINE

## 2024-09-10 PROCEDURE — 1159F MED LIST DOCD IN RCRD: CPT | Performed by: FAMILY MEDICINE

## 2024-09-10 PROCEDURE — 1160F RVW MEDS BY RX/DR IN RCRD: CPT | Performed by: FAMILY MEDICINE

## 2024-09-10 PROCEDURE — 3074F SYST BP LT 130 MM HG: CPT | Performed by: FAMILY MEDICINE

## 2024-09-10 PROCEDURE — 1126F AMNT PAIN NOTED NONE PRSNT: CPT | Performed by: FAMILY MEDICINE

## 2024-09-10 PROCEDURE — 1170F FXNL STATUS ASSESSED: CPT | Performed by: FAMILY MEDICINE

## 2024-09-10 RX ORDER — LISINOPRIL 20 MG/1
20 TABLET ORAL DAILY
Qty: 90 TABLET | Refills: 3 | Status: SHIPPED | OUTPATIENT
Start: 2024-09-10

## 2024-10-03 DIAGNOSIS — E11.51 TYPE 2 DIABETES MELLITUS WITH DIABETIC PERIPHERAL ANGIOPATHY WITHOUT GANGRENE, WITHOUT LONG-TERM CURRENT USE OF INSULIN: ICD-10-CM

## 2024-10-07 RX ORDER — BLOOD SUGAR DIAGNOSTIC
STRIP MISCELLANEOUS
Qty: 100 EACH | Refills: 3 | Status: SHIPPED | OUTPATIENT
Start: 2024-10-07

## 2024-10-20 DIAGNOSIS — E78.2 MIXED HYPERLIPIDEMIA: ICD-10-CM

## 2024-10-21 RX ORDER — ROSUVASTATIN CALCIUM 10 MG/1
10 TABLET, COATED ORAL NIGHTLY
Qty: 90 TABLET | Refills: 3 | Status: SHIPPED | OUTPATIENT
Start: 2024-10-21

## 2024-10-22 DIAGNOSIS — E11.51 TYPE 2 DIABETES MELLITUS WITH DIABETIC PERIPHERAL ANGIOPATHY WITHOUT GANGRENE, WITHOUT LONG-TERM CURRENT USE OF INSULIN: ICD-10-CM

## 2024-10-22 RX ORDER — LANCETS
EACH MISCELLANEOUS
Qty: 100 EACH | Refills: 3 | Status: SHIPPED | OUTPATIENT
Start: 2024-10-22

## 2024-10-22 RX ORDER — EMPAGLIFLOZIN 25 MG/1
TABLET, FILM COATED ORAL
Qty: 90 TABLET | Refills: 3 | Status: SHIPPED | OUTPATIENT
Start: 2024-10-22

## 2024-12-26 DIAGNOSIS — E11.42 DIABETIC PERIPHERAL NEUROPATHY: ICD-10-CM

## 2024-12-27 RX ORDER — PREGABALIN 50 MG/1
50 CAPSULE ORAL 2 TIMES DAILY
Qty: 180 CAPSULE | Refills: 1 | Status: SHIPPED | OUTPATIENT
Start: 2024-12-27

## 2025-01-27 ENCOUNTER — TELEPHONE (OUTPATIENT)
Dept: FAMILY MEDICINE CLINIC | Facility: CLINIC | Age: 60
End: 2025-01-27
Payer: MEDICARE

## 2025-04-09 NOTE — PROGRESS NOTES
Chief Complaint  Diabetes, Hypertension, and Hyperlipidemia    History of Present Illness  Anthony Jacinto presents today for follow up on diabetes, hypertension, and hyperlipidemia    Diabetes  Patient does check blood sugar at home 1 times daily.  Per patient fasting blood sugars range between 96 to 135.  Associated symptoms include Neuropathy  Per patient current diet is Variety of foods.  Patient does avoid concentrated sweets.  Patient does see podiatry.  Patient see's [facility] for diabetic eye exam and last eye exam was 08/2024.  Patient reports they are taking medications as prescribed and they are not having side effects.  Last A1c was 5.5 on 4/11/25.     Hypertension  Patient does not check blood pressure at home.   Patient denies  blurred vision, chest pain, dyspnea, headache, neck aches, orthopnea, palpitations, paroxysmal nocturnal dyspnea, peripheral edema, pulsating in the ears, and tiredness/fatigue   Patient reports they are taking medications as prescribed and they are not having side effects.    Hyperlipidemia  Patient is not following a low cholesterol diet.   Currently is on statin therapy.  Patient reports is not exercising.  Patient reports they are taking medications as prescribed and they are not having side effects.     History of Present Illness  The patient is a 59-year-old male here for follow-up on diabetes, hypertension, and hyperlipidemia.    He reports a slight weight gain, which he attributes to decreased physical activity following foot surgery. He has been on Jardiance for approximately 3 years and continues to take metformin. He has not been engaging in regular exercise. His foot condition has improved, with the absence of sharp pain, which he believes is managed by Lyrica. He reports a sensation akin to wearing socks on the side of his foot and describes a feeling of strangulation in his toes. He occasionally takes Tylenol Arthritis for back pain but avoids it unless  necessary. He has not undergone any recent laboratory tests outside of this facility. He underwent lab tests at [facility] 2 months ago. He expresses interest in receiving the COVID-19 vaccine. He recalls experiencing lung issues in 01/2025, 02/2025, or 03/2025 but is uncertain if they were related to his current conditions or another cause. He has been on Jardiance 25 mg and metformin 500 mg daily.    He is currently taking Crestor 10 mg daily.    He is currently taking lisinopril 20 mg daily.    He also reports intermittent stinging sensations in various parts of his body, accompanied by small red spots that persist. These episodes last a few minutes and are recurrent. He recalls a similar episode years ago, during which Dr. Lindsay suggested the possibility of fibromyalgia and administered an injection at the site.    He has received one dose of the shingles vaccine and is due for the second dose.    MEDICATIONS  Jardiance, metformin, Crestor, lisinopril, Lyrica, Tylenol arthritis    IMMUNIZATIONS  He has had one dose of the shingles vaccine and is due for the second dose.      Patient Care Team:  Linda Stout MD as PCP - General (Family Medicine)  Kaylin Monroe as Technologist  Urvashi Kay DPM (Podiatry)   Current Outpatient Medications on File Prior to Visit   Medication Sig    Accu-Chek Softclix Lancets lancets USE TO TEST BLOOD SUGAR ONCE DAILY IN THE MORNING    aspirin 81 MG EC tablet Take 1 tablet by mouth Daily.    cholecalciferol (Vitamin D3) 1.25 MG (20768 UT) capsule TAKE 1 CAPSULE BY MOUTH EVERY OTHER WEEK    empagliflozin (Jardiance) 25 MG tablet tablet TAKE 1 TABLET BY MOUTH DAILY    glucose blood (Accu-Chek Fanta Plus) test strip USE TO TEST BLOOD SUGAR ONCE EVERY MORNING    lisinopril (PRINIVIL,ZESTRIL) 20 MG tablet TAKE 1 TABLET BY MOUTH EVERY DAY    metFORMIN (GLUCOPHAGE) 500 MG tablet TAKE 1 TABLET BY MOUTH EVERY DAY BEFORE BREAKFAST    montelukast (SINGULAIR) 10 MG tablet  "TAKE 1 TABLET BY MOUTH EVERY NIGHT    pantoprazole (PROTONIX) 40 MG EC tablet TAKE 1 TABLET BY MOUTH DAILY    pregabalin (LYRICA) 50 MG capsule TAKE 1 CAPSULE BY MOUTH TWICE DAILY    rosuvastatin (CRESTOR) 10 MG tablet TAKE 1 TABLET BY MOUTH EVERY NIGHT    albuterol sulfate  (90 Base) MCG/ACT inhaler Inhale 2 puffs Every 4 (Four) Hours As Needed. (Patient not taking: Reported on 4/11/2025)    fluticasone (FLONASE) 50 MCG/ACT nasal spray INSTILL 2 SPRAYS INTO EACH NOSIL EVERY DAY, SHAKE WELL BEFORE USING (Patient not taking: Reported on 4/11/2025)    ondansetron (Zofran) 4 MG tablet Take 1 tablet by mouth Every 8 (Eight) Hours As Needed for Nausea or Vomiting. (Patient not taking: Reported on 4/11/2025)     No current facility-administered medications on file prior to visit.       Objective   Vital Signs:   /82 (BP Location: Right arm, Patient Position: Sitting, Cuff Size: Large Adult)   Pulse 79   Temp 97.7 °F (36.5 °C) (Temporal)   Resp 18   Ht 175.3 cm (69\")   Wt 75.4 kg (166 lb 3.2 oz)   SpO2 100%   BMI 24.54 kg/m²    BP Readings from Last 3 Encounters:   04/11/25 126/82   09/10/24 110/70   03/08/24 108/70     Wt Readings from Last 3 Encounters:   04/11/25 75.4 kg (166 lb 3.2 oz)   09/10/24 76.7 kg (169 lb 3.2 oz)   03/08/24 78.9 kg (174 lb)         Physical Exam  Vitals and nursing note reviewed.   Constitutional:       General: He is not in acute distress.     Appearance: Normal appearance. He is well-developed and normal weight. He is not ill-appearing.   Eyes:      Conjunctiva/sclera: Conjunctivae normal.      Pupils: Pupils are equal, round, and reactive to light.   Cardiovascular:      Rate and Rhythm: Normal rate and regular rhythm.      Heart sounds: No murmur heard.  Pulmonary:      Effort: Pulmonary effort is normal.      Breath sounds: No wheezing.   Musculoskeletal:         General: Normal range of motion.      Cervical back: Normal range of motion.   Skin:     General: Skin is " "warm and dry.   Neurological:      Mental Status: He is alert and oriented to person, place, and time.   Psychiatric:         Mood and Affect: Mood normal.         Behavior: Behavior normal.         Thought Content: Thought content normal.         Judgment: Judgment normal.          Physical Exam  Lungs were auscultated.  Heart was examined.    Vital Signs  Weight is 166 pounds. BMI is 24.5.      Office Visit on 04/11/2025   Component Date Value Ref Range Status    Hemoglobin A1C 04/11/2025 5.5  4.5 - 5.7 % Final    Lot Number 04/11/2025 [id number]   Final    Expiration Date 04/11/2025 02/28/2027   Final     A1C Last 3 Results          4/11/2025    13:13   HGBA1C Last 3 Results   Hemoglobin A1C 5.5      No results found for: \"CHOL\", \"CHLPL\", \"TRIG\", \"HDL\", \"LDL\", \"LDLDIRECT\"  No results found for: \"TSH\", \"T1TUDFD\", \"U5NDPIC\", \"THYROIDAB\"  No results found for: \"GLUCOSE\", \"BUN\", \"CREATININE\", \"EGFRIFNONA\", \"EGFRIFAFRI\", \"BCR\", \"K\", \"CO2\", \"CALCIUM\", \"PROTENTOTREF\", \"ALBUMIN\", \"LABIL2\", \"BILIRUBIN\", \"AST\", \"ALT\"  No results found for: \"WBC\", \"HGB\", \"HCT\", \"MCV\", \"PLT\"          Results  Laboratory Studies  A1c is 5.5.             Assessment and Plan    Diagnoses and all orders for this visit:    1. Type 2 diabetes mellitus with diabetic peripheral angiopathy without gangrene, without long-term current use of insulin (Primary)  -     POC Glycosylated Hemoglobin (Hb A1C)    2. Primary hypertension    3. Mixed hyperlipidemia    4. Former smoker    5. Need for influenza vaccination  -     COVID-19 (Pfizer) 12yrs+ (COMIRNATY)        Assessment & Plan  1. Diabetes Mellitus.  His A1c level has shown significant improvement, currently at 5.5, down from previous readings of 6.2 or 6.3. He has been on Jardiance 25 mg and metformin 500 mg daily for about 3 years. He is advised to continue his current medication regimen and maintain a balanced diet, avoiding excessive sugar intake. He is encouraged to keep his blood sugar levels " below 140, he states he is aiming for tighter control to keep his readings below 120, advised this is fine as long as he does not experience hypoglycemia.  He did have additional labs at [facility] we are calling for those reports, the patient will be contacted once the lab results are available.    2. Hypertension.  His blood pressure readings today are within the normal range. He is currently taking lisinopril 20 mg daily. He should continue his current medication regimen and monitor his blood pressure regularly.    3. Hyperlipidemia.  He is currently taking Crestor 10 mg daily. He should continue his current medication regimen and maintain a diet low in saturated fats and cholesterol.    4. Foot Pain.  He reports improvement in foot pain, attributing it to Lyrica (pregabalin). He should continue taking Lyrica as needed for pain management. He is advised to use Tylenol Arthritis for additional pain relief if necessary, as it is safe for his kidneys.    5. Health Maintenance.  He is advised to receive the second dose of the shingles vaccine through a pharmacy or health department, as Medicare may not cover it. He will receive the COVID-19 vaccine during this visit.    Follow-up  The patient will follow up in 6 months.      There are no discontinued medications.      Follow Up     Return in about 6 months (around 10/11/2025) for diabetes, htn, cholesterol etc, with Labs.    Patient was given instructions and counseling regarding his condition or for health maintenance advice. Please see specific information pulled into the AVS if appropriate.     Patient or patient representative verbalized consent for the use of Ambient Listening during the visit with  Linda Stout MD for chart documentation. 4/11/2025  18:08 EDT

## 2025-04-11 ENCOUNTER — TELEPHONE (OUTPATIENT)
Dept: FAMILY MEDICINE CLINIC | Facility: CLINIC | Age: 60
End: 2025-04-11

## 2025-04-11 ENCOUNTER — OFFICE VISIT (OUTPATIENT)
Dept: FAMILY MEDICINE CLINIC | Facility: CLINIC | Age: 60
End: 2025-04-11
Payer: MEDICARE

## 2025-04-11 VITALS
WEIGHT: 166.2 LBS | SYSTOLIC BLOOD PRESSURE: 126 MMHG | DIASTOLIC BLOOD PRESSURE: 82 MMHG | OXYGEN SATURATION: 100 % | HEIGHT: 69 IN | BODY MASS INDEX: 24.62 KG/M2 | TEMPERATURE: 97.7 F | RESPIRATION RATE: 18 BRPM | HEART RATE: 79 BPM

## 2025-04-11 DIAGNOSIS — E78.2 MIXED HYPERLIPIDEMIA: ICD-10-CM

## 2025-04-11 DIAGNOSIS — Z87.891 FORMER SMOKER: ICD-10-CM

## 2025-04-11 DIAGNOSIS — I10 PRIMARY HYPERTENSION: ICD-10-CM

## 2025-04-11 DIAGNOSIS — E11.51 TYPE 2 DIABETES MELLITUS WITH DIABETIC PERIPHERAL ANGIOPATHY WITHOUT GANGRENE, WITHOUT LONG-TERM CURRENT USE OF INSULIN: Primary | ICD-10-CM

## 2025-04-11 DIAGNOSIS — Z23 NEED FOR INFLUENZA VACCINATION: ICD-10-CM

## 2025-04-11 LAB
EXPIRATION DATE: NORMAL
HBA1C MFR BLD: 5.5 % (ref 4.5–5.7)
Lab: NORMAL

## 2025-05-15 ENCOUNTER — TELEPHONE (OUTPATIENT)
Dept: FAMILY MEDICINE CLINIC | Facility: CLINIC | Age: 60
End: 2025-05-15
Payer: MEDICARE

## 2025-05-15 NOTE — TELEPHONE ENCOUNTER
Caller: REID TOWNSEND    Relationship: NURSE      Best call back number: [phone]    Who are you requesting to speak with (clinical staff, provider,  specific staff member): CLINICAL STAFF    What was the call regarding: STATED THAT THEY ARE NEEDING TO CHECK IF THE PROVIDER IS INTERESTED IN PARTICIPATING IN ROUNDS ABOUT THE PATIENT. STATED THAT THERE IS A RISK ASSESSMENT AVAILABLE ON THE PATIENT IF THE PROVIDER WOULD LIKE TO REVIEW THAT AS WELL. PLEASE CALL AND ADVISE

## 2025-05-15 NOTE — TELEPHONE ENCOUNTER
Please contact  and ask exactly what they mean by participating in rounds.  I continue to see the patient on the regular basis, I am aware of medical conditions, and would like to know they are asking of me or what benefit are they offering.

## 2025-05-19 NOTE — TELEPHONE ENCOUNTER
Attempted to contact Shazia for a third time with no answer. Closing telephone encounter. Routing to PCP as an FYI.

## 2025-08-04 DIAGNOSIS — E11.42 DIABETIC PERIPHERAL NEUROPATHY: ICD-10-CM

## 2025-08-04 RX ORDER — PREGABALIN 50 MG/1
50 CAPSULE ORAL EVERY 12 HOURS SCHEDULED
Qty: 180 CAPSULE | Refills: 0 | Status: SHIPPED | OUTPATIENT
Start: 2025-08-04

## 2025-08-25 ENCOUNTER — TELEPHONE (OUTPATIENT)
Dept: FAMILY MEDICINE CLINIC | Facility: CLINIC | Age: 60
End: 2025-08-25
Payer: MEDICARE

## 2025-08-25 DIAGNOSIS — K21.9 GASTROESOPHAGEAL REFLUX DISEASE WITHOUT ESOPHAGITIS: ICD-10-CM

## 2025-08-25 DIAGNOSIS — E11.40 TYPE 2 DIABETES MELLITUS WITH DIABETIC NEUROPATHY, WITHOUT LONG-TERM CURRENT USE OF INSULIN: ICD-10-CM

## 2025-08-25 DIAGNOSIS — I10 PRIMARY HYPERTENSION: Primary | ICD-10-CM

## 2025-08-25 DIAGNOSIS — E55.9 VITAMIN D DEFICIENCY: ICD-10-CM

## 2025-08-25 DIAGNOSIS — E78.2 MIXED HYPERLIPIDEMIA: ICD-10-CM
